# Patient Record
Sex: MALE | Race: BLACK OR AFRICAN AMERICAN | NOT HISPANIC OR LATINO | Employment: FULL TIME | URBAN - METROPOLITAN AREA
[De-identification: names, ages, dates, MRNs, and addresses within clinical notes are randomized per-mention and may not be internally consistent; named-entity substitution may affect disease eponyms.]

---

## 2017-11-06 ENCOUNTER — HOSPITAL ENCOUNTER (EMERGENCY)
Facility: HOSPITAL | Age: 37
Discharge: HOME | End: 2017-11-06
Admitting: EMERGENCY MEDICINE
Payer: COMMERCIAL

## 2017-11-06 VITALS
TEMPERATURE: 97.8 F | OXYGEN SATURATION: 97 % | DIASTOLIC BLOOD PRESSURE: 86 MMHG | HEART RATE: 98 BPM | SYSTOLIC BLOOD PRESSURE: 154 MMHG | RESPIRATION RATE: 18 BRPM

## 2017-11-06 DIAGNOSIS — K04.7 DENTAL ABSCESS: Primary | ICD-10-CM

## 2017-11-06 PROCEDURE — 99282 EMERGENCY DEPT VISIT SF MDM: CPT

## 2017-11-06 RX ORDER — AMOXICILLIN AND CLAVULANATE POTASSIUM 875; 125 MG/1; MG/1
1 TABLET, FILM COATED ORAL ONCE
Status: COMPLETED | OUTPATIENT
Start: 2017-11-06 | End: 2017-11-06

## 2017-11-06 RX ORDER — AMOXICILLIN AND CLAVULANATE POTASSIUM 875; 125 MG/1; MG/1
1 TABLET, FILM COATED ORAL EVERY 12 HOURS SCHEDULED
Qty: 20 TABLET | Refills: 0 | Status: SHIPPED | OUTPATIENT
Start: 2017-11-06 | End: 2017-11-16

## 2017-11-06 RX ADMIN — LIDOCAINE HYDROCHLORIDE 15 ML: 20 SOLUTION ORAL; TOPICAL at 23:14

## 2017-11-06 RX ADMIN — AMOXICILLIN AND CLAVULANATE POTASSIUM 1 TABLET: 875; 125 TABLET, FILM COATED ORAL at 23:26

## 2017-11-07 NOTE — DISCHARGE INSTRUCTIONS
Dental Abscess   WHAT YOU NEED TO KNOW:   A dental abscess is a collection of pus in or around a tooth  A dental abscess is caused by bacteria  The bacteria usually enter the tooth when the enamel (outer part of the tooth) is damaged by tooth decay  Bacteria may also enter the tooth through a break or chip in the tooth, or a cut in the gum  Food particles that are stuck between the teeth for a long time may also lead to an abscess  DISCHARGE INSTRUCTIONS:   Return to the emergency department if:   · You have severe pain  · You have trouble breathing because of pain or swelling  Contact your healthcare provider if:   · Your symptoms get worse, even after treatment  · Your mouth is bleeding  · You cannot eat or drink because of pain or swelling  · Your abscess returns  · You have an injury that causes a crack in your tooth  · You have questions or concerns about your condition or care  Medicines: You may  need any of the following:  · Antibiotics  help treat a bacterial infection  · NSAIDs , such as ibuprofen, help decrease swelling, pain, and fever  This medicine is available with or without a doctor's order  NSAIDs can cause stomach bleeding or kidney problems in certain people  If you take blood thinner medicine, always ask your healthcare provider if NSAIDs are safe for you  Always read the medicine label and follow directions  · Acetaminophen  decreases pain and fever  It is available without a doctor's order  Ask how much to take and how often to take it  Follow directions  Read the labels of all other medicines you are using to see if they also contain acetaminophen, or ask your doctor or pharmacist  Acetaminophen can cause liver damage if not taken correctly  Do not use more than 4 grams (4,000 milligrams) total of acetaminophen in one day  · Prescription pain medicine  may be given  Ask your healthcare provider how to take this medicine safely   Some prescription pain medicines contain acetaminophen  Do not take other medicines that contain acetaminophen without talking to your healthcare provider  Too much acetaminophen may cause liver damage  Prescription pain medicine may cause constipation  Ask your healthcare provider how to prevent or treat constipation  · Take your medicine as directed  Contact your healthcare provider if you think your medicine is not helping or if you have side effects  Tell him of her if you are allergic to any medicine  Keep a list of the medicines, vitamins, and herbs you take  Include the amounts, and when and why you take them  Bring the list or the pill bottles to follow-up visits  Carry your medicine list with you in case of an emergency  Self-care:   · Rinse your mouth every 2 hours with salt water  This will help keep the area clean  · Gently brush your teeth twice a day with a soft tooth brush  This will help keep the area clean  · Eat soft foods as directed  Soft foods may cause less pain  Examples include applesauce, yogurt, and cooked pasta  Ask your healthcare provider how long to follow this instruction  · Apply a warm compress to your tooth or gum  Use a cotton ball or gauze soaked in warm water  Remove the compress in 10 minutes or when it becomes cool  Repeat 3 times a day  Prevent another abscess:   · Brush your teeth at least 2 times a day with fluoride toothpaste  · Use dental floss to clean between your teeth at least once a day  · Rinse your mouth with water or mouthwash after meals and snacks  · Chew sugarless gum after meals and snacks  · Limit foods that are sticky and high in sugar such as raisons  Also limit drinks high in sugar, such as soda  · See your dentist every 6 months for dental cleanings and oral exams  Follow up with your healthcare provider in 24 hours: Your healthcare provider will need to check your teeth and gums   Write down your questions so you remember to ask them during your visits  © 2017 [employer] Information is for End User's use only and may not be sold, redistributed or otherwise used for commercial purposes  All illustrations and images included in CareNotes® are the copyrighted property of [employer]  or [employer]  The above information is an  only  It is not intended as medical advice for individual conditions or treatments  Talk to your doctor, nurse or pharmacist before following any medical regimen to see if it is safe and effective for you

## 2017-11-07 NOTE — ED PROVIDER NOTES
History  Chief Complaint   Patient presents with    Dental Pain     right upper tootache x2days      41-year-old male presents today complaining of right upper toothache for the past 2 days  He reports facial swelling and gingival swelling  No fevers  No history of dental abscess  Last saw a dentist 1 year ago  Has been taking motrin for pain, last dose 3 hours ago  None       Past Medical History:   Diagnosis Date    No known health problems        Past Surgical History:   Procedure Laterality Date    NO PAST SURGERIES         History reviewed  No pertinent family history  I have reviewed and agree with the history as documented  Social History   Substance Use Topics    Smoking status: Never Smoker    Smokeless tobacco: Never Used    Alcohol use Yes      Comment: social        Review of Systems   HENT: Positive for dental problem  All other systems reviewed and are negative  Physical Exam  ED Triage Vitals [11/06/17 2250]   Temperature Pulse Respirations Blood Pressure SpO2   97 8 °F (36 6 °C) 98 18 154/86 97 %      Temp Source Heart Rate Source Patient Position - Orthostatic VS BP Location FiO2 (%)   Temporal -- -- -- --      Pain Score       8           Orthostatic Vital Signs  Vitals:    11/06/17 2250   BP: 154/86   Pulse: 98       Physical Exam   Constitutional: He appears well-developed and well-nourished  No distress  HENT:   Head: Normocephalic and atraumatic  Mouth/Throat: Mucous membranes are normal  No trismus in the jaw  Dental abscesses present  Right-sided facial swelling with right upper gingival swelling noted  Drainage attempted with 18g needle, however aspiration was unsuccessful  Eyes: Conjunctivae and EOM are normal    Neck: Normal range of motion  Neck supple  Cardiovascular: Normal rate and regular rhythm  Pulmonary/Chest: Effort normal and breath sounds normal  No respiratory distress  He has no wheezes  Abdominal: Soft     Musculoskeletal: Normal range of motion  Lymphadenopathy:     He has no cervical adenopathy  Neurological: He is alert  Skin: Skin is warm and dry  He is not diaphoretic  ED Medications  Medications   lidocaine viscous (XYLOCAINE) 2 % mucosal solution 15 mL (15 mL Swish & Spit Given 11/6/17 0240)   amoxicillin-clavulanate (AUGMENTIN) 875-125 mg per tablet 1 tablet (1 tablet Oral Given 11/6/17 6003)       Diagnostic Studies  Results Reviewed     None                 No orders to display              Procedures  Procedures       Phone Contacts  ED Phone Contact    ED Course  ED Course                                MDM  CritCare Time    Disposition  Final diagnoses:   Dental abscess     Time reflects when diagnosis was documented in both MDM as applicable and the Disposition within this note     Time User Action Codes Description Comment    11/6/2017 11:23 PM Earnest Martini Add [K04 7] Dental abscess       ED Disposition     ED Disposition Condition Comment    Discharge  Jaki Naik discharge to home/self care  Condition at discharge: Good        Follow-up Information     Follow up With Specialties Details Why Contact Info       Follow-up with your dentist as soon as possible  Patient's Medications   Discharge Prescriptions    AMOXICILLIN-CLAVULANATE (AUGMENTIN) 875-125 MG PER TABLET    Take 1 tablet by mouth every 12 (twelve) hours for 10 days       Start Date: 11/6/2017 End Date: 11/16/2017       Order Dose: 1 tablet       Quantity: 20 tablet    Refills: 0     No discharge procedures on file      ED Provider  Electronically Signed by           Osiel Britton PA-C  11/06/17 4668

## 2018-03-29 LAB
ABSOL LYMPHOCYTES (HISTORICAL): 2.4 K/UL (ref 0.5–4)
ALBUMIN SERPL BCP-MCNC: 4.2 G/DL (ref 3–5.2)
ALP SERPL-CCNC: 91 U/L (ref 43–122)
ALT SERPL W P-5'-P-CCNC: 43 U/L (ref 9–52)
ANION GAP SERPL CALCULATED.3IONS-SCNC: 11 MMOL/L (ref 5–14)
AST SERPL W P-5'-P-CCNC: 32 U/L (ref 17–59)
BASOPHILS # BLD AUTO: 0.1 K/UL (ref 0–0.1)
BASOPHILS # BLD AUTO: 1 % (ref 0–1)
BILIRUB SERPL-MCNC: 0.3 MG/DL
BUN SERPL-MCNC: 14 MG/DL (ref 5–25)
CALCIUM SERPL-MCNC: 9.5 MG/DL (ref 8.4–10.2)
CHLORIDE SERPL-SCNC: 105 MEQ/L (ref 97–108)
CHOLEST SERPL-MCNC: 162 MG/DL
CHOLEST/HDLC SERPL: 6.2 {RATIO}
CO2 SERPL-SCNC: 27 MMOL/L (ref 22–30)
COMMENT (HISTORICAL): ABNORMAL
CREATINE, SERUM (HISTORICAL): 0.86 MG/DL (ref 0.7–1.5)
DEPRECATED RDW RBC AUTO: 13.5 %
EGFR (HISTORICAL): >60 ML/MIN/1.73 M2
EOSINOPHIL # BLD AUTO: 0.4 K/UL (ref 0–0.4)
EOSINOPHIL NFR BLD AUTO: 4 % (ref 0–6)
GLUCOSE SERPL-MCNC: 80 MG/DL (ref 70–99)
HCT VFR BLD AUTO: 45.1 % (ref 41–53)
HDLC SERPL-MCNC: 26 MG/DL
HGB BLD-MCNC: 15.1 G/DL (ref 13.5–17.5)
LDL/HDL RATIO (HISTORICAL): 3.8
LDLC SERPL CALC-MCNC: 98 MG/DL
LYMPHOCYTES NFR BLD AUTO: 21 % (ref 25–45)
MCH RBC QN AUTO: 30.1 PG (ref 26–34)
MCHC RBC AUTO-ENTMCNC: 33.4 % (ref 31–36)
MCV RBC AUTO: 90 FL (ref 80–100)
MONOCYTES # BLD AUTO: 1.5 K/UL (ref 0.2–0.9)
MONOCYTES NFR BLD AUTO: 13 % (ref 1–10)
NEUTROPHILS ABS COUNT (HISTORICAL): 6.8 K/UL (ref 1.8–7.8)
NEUTS SEG NFR BLD AUTO: 61 % (ref 45–65)
PLATELET # BLD AUTO: 400 K/MCL (ref 150–450)
POTASSIUM SERPL-SCNC: 4.1 MEQ/L (ref 3.6–5)
RBC # BLD AUTO: 5 M/MCL (ref 4.5–5.9)
RBC MORPHOLOGY (HISTORICAL): ABNORMAL
SODIUM SERPL-SCNC: 143 MEQ/L (ref 137–147)
TOTAL PROTEIN (HISTORICAL): 7.5 G/DL (ref 5.9–8.4)
TRIGL SERPL-MCNC: 189 MG/DL
TSH SERPL DL<=0.05 MIU/L-ACNC: 3.39 UIU/ML (ref 0.47–4.68)
VLDLC SERPL CALC-MCNC: 38 MG/DL (ref 0–40)
WBC # BLD AUTO: 11.2 K/MCL (ref 4.5–11)

## 2019-06-18 ENCOUNTER — APPOINTMENT (OUTPATIENT)
Dept: LAB | Facility: CLINIC | Age: 39
End: 2019-06-18
Payer: COMMERCIAL

## 2019-06-18 ENCOUNTER — OFFICE VISIT (OUTPATIENT)
Dept: FAMILY MEDICINE CLINIC | Facility: CLINIC | Age: 39
End: 2019-06-18
Payer: COMMERCIAL

## 2019-06-18 VITALS
DIASTOLIC BLOOD PRESSURE: 94 MMHG | WEIGHT: 274 LBS | HEART RATE: 84 BPM | SYSTOLIC BLOOD PRESSURE: 140 MMHG | HEIGHT: 74 IN | BODY MASS INDEX: 35.16 KG/M2

## 2019-06-18 DIAGNOSIS — Z00.00 PHYSICAL EXAM: Primary | ICD-10-CM

## 2019-06-18 DIAGNOSIS — Z00.00 PHYSICAL EXAM: ICD-10-CM

## 2019-06-18 DIAGNOSIS — M79.10 MYALGIA: ICD-10-CM

## 2019-06-18 DIAGNOSIS — K21.9 GASTROESOPHAGEAL REFLUX DISEASE, ESOPHAGITIS PRESENCE NOT SPECIFIED: ICD-10-CM

## 2019-06-18 DIAGNOSIS — R53.83 FATIGUE, UNSPECIFIED TYPE: ICD-10-CM

## 2019-06-18 DIAGNOSIS — R63.4 WEIGHT LOSS: ICD-10-CM

## 2019-06-18 LAB
25(OH)D3 SERPL-MCNC: 8.4 NG/ML (ref 30–100)
ALBUMIN SERPL BCP-MCNC: 3.7 G/DL (ref 3.5–5)
ALP SERPL-CCNC: 102 U/L (ref 46–116)
ALT SERPL W P-5'-P-CCNC: 28 U/L (ref 12–78)
ANION GAP SERPL CALCULATED.3IONS-SCNC: 2 MMOL/L (ref 4–13)
AST SERPL W P-5'-P-CCNC: 26 U/L (ref 5–45)
BILIRUB SERPL-MCNC: 0.57 MG/DL (ref 0.2–1)
BUN SERPL-MCNC: 9 MG/DL (ref 5–25)
CALCIUM SERPL-MCNC: 9.2 MG/DL (ref 8.3–10.1)
CHLORIDE SERPL-SCNC: 106 MMOL/L (ref 100–108)
CO2 SERPL-SCNC: 28 MMOL/L (ref 21–32)
CREAT SERPL-MCNC: 0.83 MG/DL (ref 0.6–1.3)
ERYTHROCYTE [DISTWIDTH] IN BLOOD BY AUTOMATED COUNT: 13.3 % (ref 11.6–15.1)
GFR SERPL CREATININE-BSD FRML MDRD: 127 ML/MIN/1.73SQ M
GLUCOSE P FAST SERPL-MCNC: 96 MG/DL (ref 65–99)
HCT VFR BLD AUTO: 46.7 % (ref 36.5–49.3)
HGB BLD-MCNC: 14.9 G/DL (ref 12–17)
MCH RBC QN AUTO: 30.1 PG (ref 26.8–34.3)
MCHC RBC AUTO-ENTMCNC: 31.9 G/DL (ref 31.4–37.4)
MCV RBC AUTO: 94 FL (ref 82–98)
PLATELET # BLD AUTO: 378 THOUSANDS/UL (ref 149–390)
PMV BLD AUTO: 9.7 FL (ref 8.9–12.7)
POTASSIUM SERPL-SCNC: 4.3 MMOL/L (ref 3.5–5.3)
PROT SERPL-MCNC: 8.2 G/DL (ref 6.4–8.2)
RBC # BLD AUTO: 4.95 MILLION/UL (ref 3.88–5.62)
SODIUM SERPL-SCNC: 136 MMOL/L (ref 136–145)
TSH SERPL DL<=0.05 MIU/L-ACNC: 2.16 UIU/ML (ref 0.36–3.74)
WBC # BLD AUTO: 12.57 THOUSAND/UL (ref 4.31–10.16)

## 2019-06-18 PROCEDURE — 80053 COMPREHEN METABOLIC PANEL: CPT

## 2019-06-18 PROCEDURE — 86618 LYME DISEASE ANTIBODY: CPT

## 2019-06-18 PROCEDURE — 85027 COMPLETE CBC AUTOMATED: CPT

## 2019-06-18 PROCEDURE — 99396 PREV VISIT EST AGE 40-64: CPT | Performed by: FAMILY MEDICINE

## 2019-06-18 PROCEDURE — 82306 VITAMIN D 25 HYDROXY: CPT

## 2019-06-18 PROCEDURE — 84443 ASSAY THYROID STIM HORMONE: CPT

## 2019-06-18 PROCEDURE — 36415 COLL VENOUS BLD VENIPUNCTURE: CPT

## 2019-06-18 RX ORDER — RANITIDINE 150 MG/1
150 CAPSULE ORAL 2 TIMES DAILY
Qty: 60 CAPSULE | Refills: 1 | Status: SHIPPED | OUTPATIENT
Start: 2019-06-18 | End: 2019-10-09 | Stop reason: ALTCHOICE

## 2019-06-20 LAB
B BURGDOR IGG SER IA-ACNC: 0.12
B BURGDOR IGM SER IA-ACNC: 0.17

## 2019-06-25 ENCOUNTER — TELEPHONE (OUTPATIENT)
Dept: FAMILY MEDICINE CLINIC | Facility: CLINIC | Age: 39
End: 2019-06-25

## 2019-07-02 ENCOUNTER — TELEPHONE (OUTPATIENT)
Dept: FAMILY MEDICINE CLINIC | Facility: CLINIC | Age: 39
End: 2019-07-02

## 2019-07-02 NOTE — TELEPHONE ENCOUNTER
Pt's vitamin D level is extremely low and that can be th cause of his muscle and bone pain  I advise Ergocalciferol 50,000 U PO Q Wk x 20 weeks with a repeat Vitamin D level in between 16-18 weeks   If patient is agreeable, make orders appropriately and forward them to me for my approval

## 2019-07-08 DIAGNOSIS — E55.9 VITAMIN D DEFICIENCY: Primary | ICD-10-CM

## 2019-07-08 RX ORDER — ERGOCALCIFEROL 1.25 MG/1
50000 CAPSULE ORAL WEEKLY
Qty: 4 CAPSULE | Refills: 5 | Status: SHIPPED | OUTPATIENT
Start: 2019-07-08 | End: 2019-10-09 | Stop reason: ALTCHOICE

## 2019-09-19 ENCOUNTER — OFFICE VISIT (OUTPATIENT)
Dept: FAMILY MEDICINE CLINIC | Facility: CLINIC | Age: 39
End: 2019-09-19
Payer: COMMERCIAL

## 2019-09-19 ENCOUNTER — APPOINTMENT (OUTPATIENT)
Dept: LAB | Facility: HOSPITAL | Age: 39
End: 2019-09-19
Payer: COMMERCIAL

## 2019-09-19 ENCOUNTER — HOSPITAL ENCOUNTER (OUTPATIENT)
Dept: RADIOLOGY | Facility: HOSPITAL | Age: 39
Discharge: HOME | End: 2019-09-19
Payer: COMMERCIAL

## 2019-09-19 VITALS
HEIGHT: 74 IN | HEART RATE: 82 BPM | SYSTOLIC BLOOD PRESSURE: 120 MMHG | DIASTOLIC BLOOD PRESSURE: 80 MMHG | BODY MASS INDEX: 34.65 KG/M2 | OXYGEN SATURATION: 93 % | WEIGHT: 270 LBS | TEMPERATURE: 98 F | RESPIRATION RATE: 18 BRPM

## 2019-09-19 DIAGNOSIS — G89.29 CHRONIC RIGHT SHOULDER PAIN: ICD-10-CM

## 2019-09-19 DIAGNOSIS — R63.4 WEIGHT LOSS: ICD-10-CM

## 2019-09-19 DIAGNOSIS — M25.511 CHRONIC RIGHT SHOULDER PAIN: ICD-10-CM

## 2019-09-19 DIAGNOSIS — D72.829 LEUKOCYTOSIS, UNSPECIFIED TYPE: ICD-10-CM

## 2019-09-19 DIAGNOSIS — E55.9 VITAMIN D DEFICIENCY: Primary | ICD-10-CM

## 2019-09-19 DIAGNOSIS — E55.9 VITAMIN D DEFICIENCY: ICD-10-CM

## 2019-09-19 DIAGNOSIS — K21.9 GASTROESOPHAGEAL REFLUX DISEASE WITHOUT ESOPHAGITIS: ICD-10-CM

## 2019-09-19 LAB
25(OH)D3 SERPL-MCNC: 25.2 NG/ML (ref 30–100)
CRP SERPL QL: 23.5 MG/L
ERYTHROCYTE [DISTWIDTH] IN BLOOD BY AUTOMATED COUNT: 13.3 % (ref 11.6–15.1)
HCT VFR BLD AUTO: 48.8 % (ref 36.5–49.3)
HGB BLD-MCNC: 15.6 G/DL (ref 12–17)
MCH RBC QN AUTO: 30.9 PG (ref 26.8–34.3)
MCHC RBC AUTO-ENTMCNC: 32 G/DL (ref 31.4–37.4)
MCV RBC AUTO: 97 FL (ref 82–98)
PLATELET # BLD AUTO: 442 THOUSANDS/UL (ref 149–390)
PMV BLD AUTO: 9.2 FL (ref 8.9–12.7)
RBC # BLD AUTO: 5.05 MILLION/UL (ref 3.88–5.62)
WBC # BLD AUTO: 14.05 THOUSAND/UL (ref 4.31–10.16)

## 2019-09-19 PROCEDURE — 85027 COMPLETE CBC AUTOMATED: CPT | Performed by: FAMILY MEDICINE

## 2019-09-19 PROCEDURE — 99214 OFFICE O/P EST MOD 30 MIN: CPT | Performed by: FAMILY MEDICINE

## 2019-09-19 PROCEDURE — 73030 X-RAY EXAM OF SHOULDER: CPT

## 2019-09-19 PROCEDURE — 86677 HELICOBACTER PYLORI ANTIBODY: CPT | Performed by: FAMILY MEDICINE

## 2019-09-19 PROCEDURE — 86140 C-REACTIVE PROTEIN: CPT

## 2019-09-19 PROCEDURE — 82306 VITAMIN D 25 HYDROXY: CPT

## 2019-09-19 PROCEDURE — 36415 COLL VENOUS BLD VENIPUNCTURE: CPT | Performed by: FAMILY MEDICINE

## 2019-09-19 RX ORDER — ALBUTEROL SULFATE 90 UG/1
AEROSOL, METERED RESPIRATORY (INHALATION)
COMMUNITY
Start: 2018-05-03 | End: 2020-03-19 | Stop reason: SDUPTHER

## 2019-09-19 NOTE — PATIENT INSTRUCTIONS
Joel Rg was seen today for follow-up  Diagnoses and all orders for this visit:    Vitamin D deficiency  Comments:  - Continue Vit D supplement as instructed  - Repeat Vitamin D level after 5 weeks of therapy  Orders:  -     Vitamin D 25 hydroxy; Future    Chronic right shoulder pain  Comments:  - Continue Zantac 150 BID  - Discussed nonpharmacological measures  - May return to work on Monday (9/23/19)  Orders:  -     Ambulatory referral to Physical Therapy; Future  -     XR shoulder 2+ vw right; Future    Gastroesophageal reflux disease without esophagitis  -     H  pylori antibody, IgG    Leukocytosis, unspecified type  -     CBC    Weight loss  -     C-reactive protein;  Future

## 2019-09-19 NOTE — PROGRESS NOTES
Assessment and Plan:    Problem List Items Addressed This Visit     None      Visit Diagnoses     Vitamin D deficiency    -  Primary    - Continue Vit D supplement as instructed  - Repeat Vitamin D level after 5 weeks of therapy    Relevant Orders    Vitamin D 25 hydroxy    Chronic right shoulder pain        - Continue Zantac 150 BID  - Discussed nonpharmacological measures  - May return to work on Monday (9/23/19)    Relevant Orders    Ambulatory referral to Physical Therapy    XR shoulder 2+ vw right    Gastroesophageal reflux disease without esophagitis        Relevant Orders    H  pylori antibody, IgG    Leukocytosis, unspecified type        Relevant Orders    CBC (Completed)    Weight loss        Relevant Orders    C-reactive protein (Completed)                 Diagnoses and all orders for this visit:    Vitamin D deficiency  Comments:  - Continue Vit D supplement as instructed  - Repeat Vitamin D level after 5 weeks of therapy  Orders:  -     Vitamin D 25 hydroxy; Future    Chronic right shoulder pain  Comments:  - Continue Zantac 150 BID  - Discussed nonpharmacological measures  - May return to work on Monday (9/23/19)  Orders:  -     Ambulatory referral to Physical Therapy; Future  -     XR shoulder 2+ vw right; Future    Gastroesophageal reflux disease without esophagitis  -     H  pylori antibody, IgG    Leukocytosis, unspecified type  -     CBC    Weight loss  -     C-reactive protein; Future    Other orders  -     albuterol (PROAIR HFA) 90 mcg/act inhaler; inhale 2 puff by inhalation route15 minute before exercise or every 6 hours as needed              Subjective:      Patient ID: Deisy Kumar is a 34 y o  male  CC:    Chief Complaint   Patient presents with    Follow-up     pt is here for a follow up from his blood work, and concerns about pain throughout his whole body       HPI:    Pt started taking Ergocalciferol supplement weekly   Still c/o pain almost all over his body including muscle & bone and not particularly joints    However, he has more pain over his R  Shoulder pain that has been going on for long time  Pt also c/o b/l hand pain & stiffness but not particularly in the morning  Pt is single, not sure if he snores  Denies daytime sleepiness  The following portions of the patient's history were reviewed and updated as appropriate: past social history and past surgical history  Review of Systems   Constitutional: Positive for fatigue and unexpected weight change  Negative for appetite change  HENT: Negative  Respiratory: Negative for cough and shortness of breath  Cardiovascular: Negative for chest pain  Gastrointestinal: Negative for blood in stool, constipation, diarrhea, nausea and vomiting  Heart burn   Endocrine: Negative for polyuria  Musculoskeletal: Positive for myalgias  Skin: Negative for rash  Neurological: Negative for dizziness and headaches  Data to review:       Objective:    Vitals:    09/19/19 1208   BP: 120/80   BP Location: Left arm   Patient Position: Sitting   Cuff Size: Adult   Pulse: 82   Resp: 18   Temp: 98 °F (36 7 °C)   TempSrc: Temporal   SpO2: 93%   Weight: 122 kg (270 lb)   Height: 6' 1 5" (1 867 m)        Physical Exam   Constitutional: He appears well-developed and well-nourished  Eyes: Pupils are equal, round, and reactive to light  Conjunctivae and EOM are normal    Neck: No thyromegaly present  Cardiovascular: Normal rate, regular rhythm and normal heart sounds  No murmur heard  Pulmonary/Chest: Effort normal and breath sounds normal  No respiratory distress  Abdominal: There is no tenderness  Lymphadenopathy:     He has no cervical adenopathy  Neurological: He is alert  He displays normal reflexes  No cranial nerve deficit  Skin: Capillary refill takes less than 2 seconds     Psychiatric:   Anxious

## 2019-09-20 LAB — H PYLORI IGG SER IA-ACNC: 0.61 INDEX VALUE (ref 0–0.79)

## 2019-09-26 ENCOUNTER — TELEPHONE (OUTPATIENT)
Dept: FAMILY MEDICINE CLINIC | Facility: CLINIC | Age: 39
End: 2019-09-26

## 2019-10-04 ENCOUNTER — TELEPHONE (OUTPATIENT)
Dept: FAMILY MEDICINE CLINIC | Facility: CLINIC | Age: 39
End: 2019-10-04

## 2019-10-04 NOTE — TELEPHONE ENCOUNTER
Arianna Marr, this is a Dr Mariama Meyer patient  He called to review his lab results  Dr Mariama Meyer had reached out to pt himself and [facility] for pt to c/b but left no instructions for us to tell pt  Can you please review  I spoke to pt and he states Dr Mariama Meyer did the BW do to unintentional weight loss and muscle pains  Are you willing to review and let me know  Thank you

## 2019-10-04 NOTE — TELEPHONE ENCOUNTER
Patient has had several nonspecific abnormalities of his labs including elevated white count and CRP  Accompanied by weight loss I would feel more comfortable if he had follow-up with 1 of the providers to see if further imaging studies are necessary  His vitamin-D level was also low and he should take 5000 units of vitamin-D 3 daily

## 2019-10-09 ENCOUNTER — OFFICE VISIT (OUTPATIENT)
Dept: FAMILY MEDICINE CLINIC | Facility: CLINIC | Age: 39
End: 2019-10-09
Payer: COMMERCIAL

## 2019-10-09 VITALS
SYSTOLIC BLOOD PRESSURE: 118 MMHG | WEIGHT: 275.2 LBS | BODY MASS INDEX: 35.32 KG/M2 | HEART RATE: 88 BPM | DIASTOLIC BLOOD PRESSURE: 72 MMHG | HEIGHT: 74 IN

## 2019-10-09 DIAGNOSIS — Z83.3 FAMILY HISTORY OF DIABETES MELLITUS: ICD-10-CM

## 2019-10-09 DIAGNOSIS — R63.4 WEIGHT LOSS: ICD-10-CM

## 2019-10-09 DIAGNOSIS — D72.829 LEUKOCYTOSIS, UNSPECIFIED TYPE: ICD-10-CM

## 2019-10-09 DIAGNOSIS — E78.2 MIXED HYPERLIPIDEMIA: ICD-10-CM

## 2019-10-09 DIAGNOSIS — K04.7 DENTAL ABSCESS: ICD-10-CM

## 2019-10-09 DIAGNOSIS — F41.1 GENERALIZED ANXIETY DISORDER: ICD-10-CM

## 2019-10-09 DIAGNOSIS — E66.09 CLASS 2 OBESITY DUE TO EXCESS CALORIES WITHOUT SERIOUS COMORBIDITY WITH BODY MASS INDEX (BMI) OF 35.0 TO 35.9 IN ADULT: ICD-10-CM

## 2019-10-09 DIAGNOSIS — E55.9 VITAMIN D DEFICIENCY: Primary | ICD-10-CM

## 2019-10-09 DIAGNOSIS — K21.9 GASTROESOPHAGEAL REFLUX DISEASE WITHOUT ESOPHAGITIS: ICD-10-CM

## 2019-10-09 PROBLEM — E66.9 OBESITY: Status: ACTIVE | Noted: 2019-10-09

## 2019-10-09 PROCEDURE — 3008F BODY MASS INDEX DOCD: CPT | Performed by: FAMILY MEDICINE

## 2019-10-09 PROCEDURE — 99214 OFFICE O/P EST MOD 30 MIN: CPT | Performed by: FAMILY MEDICINE

## 2019-10-09 RX ORDER — FAMOTIDINE 40 MG/1
40 TABLET, FILM COATED ORAL DAILY
Qty: 30 TABLET | Refills: 5 | Status: SHIPPED | OUTPATIENT
Start: 2019-10-09 | End: 2020-03-19 | Stop reason: SDUPTHER

## 2019-10-09 NOTE — ASSESSMENT & PLAN NOTE
Patient does have a dental abscess  This is based on his description  Given that he also has some facial discomfort, I would be concerned that there is a communication with the sinuses  CT of the sinuses recommended  Follow with oral surgery as well  Patient can certainly follow with dental as well  Of note, patient does have a high CRP, as well as an increased white count  This is likely on the basis of the dental abscess

## 2019-10-09 NOTE — ASSESSMENT & PLAN NOTE
Patient does have an increased white count, but is likely on the basis of the smoldering dental abscess that he has  At this point, would recommend that he get CT scan of the face as this would help with diagnosis of the dental abscess and the possibility of communicating to the sinuses given that he is having a significant amount of facial discomfort

## 2019-10-09 NOTE — PATIENT INSTRUCTIONS
Problem List Items Addressed This Visit     Dental abscess     Patient does have a dental abscess  This is based on his description  Given that he also has some facial discomfort, I would be concerned that there is a communication with the sinuses  CT of the sinuses recommended  Follow with oral surgery as well  Patient can certainly follow with dental as well  Of note, patient does have a high CRP, as well as an increased white count  This is likely on the basis of the dental abscess  Relevant Orders    Ambulatory referral to Oral Maxillofacial Surgery    CBC and differential    CT sinus wo contrast    Family history of diabetes mellitus     Prior blood sugar was normal   It was 96 in June  Follow in the future  Limit carbohydrates  Gastroesophageal reflux disease without esophagitis     Patient seems to be doing relatively well  He does have reflux symptoms at times, more specifically some irritation of gas buildup in the stomach  Recommend change from Zantac to Pepcid 40 mg  Re-evaluate in the future  Certainly, with weight loss I would expect improvement  Of note, his BMI is not severely abnormal, but the goal still is 25 or less  Relevant Medications    famotidine (PEPCID) 40 MG tablet    RESOLVED: Generalized anxiety disorder     Patient was having significant issue years ago  Currently, he reports that he is not having any issues  He is not currently on medications  This diagnosis was updated from reconciliation, i e  Was a diagnosis listed at [facility]  Will resolved today  Leukocytosis     Patient does have an increased white count, but is likely on the basis of the smoldering dental abscess that he has  At this point, would recommend that he get CT scan of the face as this would help with diagnosis of the dental abscess and the possibility of communicating to the sinuses given that he is having a significant amount of facial discomfort           Relevant Orders CBC and differential    Mixed hyperlipidemia     HDL was low on prior lipid panel in 2018  Recheck  Relevant Orders    Comprehensive metabolic panel    Lipid panel    Obesity     Patient does have elevated BMI  Recommend concentrate on limiting carbohydrates, increasing exercise, and follow up after that  Relevant Orders    Comprehensive metabolic panel    Vitamin D deficiency - Primary     Vitamin-D is much improved versus before  I would recommend at this point that the patient finish the current bottle of 62208 unit tablets (prescription), and then start 5000 unit over-the-counter supplements  Recheck in 6 months  Relevant Medications    Cholecalciferol (VITAMIN D-3) 5000 units TABS    Weight loss     Weight loss was noted previously, but was a slow weight loss  Patient has recently increased weight  There may been some effect from dental abscess before, as he mentioned he was not eating much at all

## 2019-10-09 NOTE — ASSESSMENT & PLAN NOTE
Weight loss was noted previously, but was a slow weight loss  Patient has recently increased weight  There may been some effect from dental abscess before, as he mentioned he was not eating much at all

## 2019-10-09 NOTE — ASSESSMENT & PLAN NOTE
Vitamin-D is much improved versus before  I would recommend at this point that the patient finish the current bottle of 51715 unit tablets (prescription), and then start 5000 unit over-the-counter supplements  Recheck in 6 months

## 2019-10-09 NOTE — PROGRESS NOTES
Assessment and Plan:    Problem List Items Addressed This Visit     Dental abscess     Patient does have a dental abscess  This is based on his description  Given that he also has some facial discomfort, I would be concerned that there is a communication with the sinuses  CT of the sinuses recommended  Follow with oral surgery as well  Patient can certainly follow with dental as well  Of note, patient does have a high CRP, as well as an increased white count  This is likely on the basis of the dental abscess  Relevant Orders    Ambulatory referral to Oral Maxillofacial Surgery    CBC and differential    CT sinus wo contrast    Family history of diabetes mellitus     Prior blood sugar was normal   It was 96 in June  Follow in the future  Limit carbohydrates  Gastroesophageal reflux disease without esophagitis     Patient seems to be doing relatively well  He does have reflux symptoms at times, more specifically some irritation of gas buildup in the stomach  Recommend change from Zantac to Pepcid 40 mg  Re-evaluate in the future  Certainly, with weight loss I would expect improvement  Of note, his BMI is not severely abnormal, but the goal still is 25 or less  Relevant Medications    famotidine (PEPCID) 40 MG tablet    RESOLVED: Generalized anxiety disorder     Patient was having significant issue years ago  Currently, he reports that he is not having any issues  He is not currently on medications  This diagnosis was updated from reconciliation, i e  Was a diagnosis listed at [facility]  Will resolved today  Leukocytosis     Patient does have an increased white count, but is likely on the basis of the smoldering dental abscess that he has  At this point, would recommend that he get CT scan of the face as this would help with diagnosis of the dental abscess and the possibility of communicating to the sinuses given that he is having a significant amount of facial discomfort  Relevant Orders    CBC and differential    Mixed hyperlipidemia     HDL was low on prior lipid panel in 2018  Recheck  Relevant Orders    Comprehensive metabolic panel    Lipid panel    Obesity     Patient does have elevated BMI  Recommend concentrate on limiting carbohydrates, increasing exercise, and follow up after that  Relevant Orders    Comprehensive metabolic panel    Vitamin D deficiency - Primary     Vitamin-D is much improved versus before  I would recommend at this point that the patient finish the current bottle of 83246 unit tablets (prescription), and then start 5000 unit over-the-counter supplements  Recheck in 6 months  Relevant Medications    Cholecalciferol (VITAMIN D-3) 5000 units TABS    Weight loss     Weight loss was noted previously, but was a slow weight loss  Patient has recently increased weight  There may been some effect from dental abscess before, as he mentioned he was not eating much at all  Diagnoses and all orders for this visit:    Vitamin D deficiency  -     Cholecalciferol (VITAMIN D-3) 5000 units TABS; Take 5,000 mg by mouth daily    Gastroesophageal reflux disease without esophagitis  -     famotidine (PEPCID) 40 MG tablet; Take 1 tablet (40 mg total) by mouth daily    Leukocytosis, unspecified type  -     CBC and differential; Future    Weight loss    Class 2 obesity due to excess calories without serious comorbidity with body mass index (BMI) of 35 0 to 35 9 in adult  -     Comprehensive metabolic panel; Future    Generalized anxiety disorder    Family history of diabetes mellitus    Dental abscess  -     Ambulatory referral to Oral Maxillofacial Surgery; Future  -     CBC and differential; Future  -     CT sinus wo contrast; Future    Mixed hyperlipidemia  -     Comprehensive metabolic panel; Future  -     Lipid panel; Future              Subjective:      Patient ID: Nu Andrade is a 34 y o  male      CC:    Chief Complaint   Patient presents with    Follow-up     Follow up and review BW from 9/2019  kw    Results    Vitamin D Deficiency     Pt would like to know once 50,000 unit of Vitamin D is done how much OTC should he use  kw       HPI:    Patient is here for several issues  With regard to laboratory studies, reviewed  He has had tooth infection for about a year now  Had Abscess and it was draining as well  He is feeling pressure in face for a while now  Vit D defic:  Patient did have a low vitamin-D previously  Currently, was reviewed and is much better than what it was  He is on supplementation currently  Patient did have a period of Weight loss previously  It was a slow weight loss per patient's report  Nothing specific was going on at that time, just general slow decline  Weight reviewed  Patient does have a history of reflux  Currently using Zantac  Patient does have a history of anxiety and depression listed  This is from [facility]  He reports that he was able to beat this with the help of basketball, and currently does not suffer from depression or anxiety symptoms at all  Patient does have some bronchospasm that is exercise/allergy induced  No current symptoms  The following portions of the patient's history were reviewed and updated as appropriate: allergies, current medications, past family history, past medical history, past social history, past surgical history and problem list       Review of Systems   Constitutional: Negative  HENT:        Per HPI   Eyes: Negative  Respiratory: Negative  Cardiovascular: Negative  Gastrointestinal: Negative  Endocrine: Negative  Genitourinary: Negative  Musculoskeletal: Negative  Skin: Negative  Allergic/Immunologic: Negative  Neurological: Negative  Hematological: Negative  Psychiatric/Behavioral: Negative  Data to review:   Vitamin-D 25 2  Prior vitamin-D was 8  CRP 23 5    White count 14 05  Hemoglobin 15 6, hematocrit 48 8, platelets 797  Helicobacter pylori IgG was 0 61, negative  Labs from June:    Lyme titer: IgG negative, IgM negative    White count 12 57  Objective:    Vitals:    10/09/19 1810   BP: 118/72   BP Location: Left arm   Patient Position: Sitting   Pulse: 88   Weight: 125 kg (275 lb 3 2 oz)   Height: 6' 1 5" (1 867 m)        Physical Exam   Constitutional: He appears well-developed and well-nourished  HENT:   Head: Normocephalic and atraumatic  Neck: Normal range of motion  Neck supple  Cardiovascular: Normal rate, regular rhythm and normal heart sounds  Exam reveals no gallop and no friction rub  No murmur heard  Pulses:       Carotid pulses are 2+ on the right side, and 2+ on the left side  Pulmonary/Chest: Effort normal and breath sounds normal  No respiratory distress  He has no wheezes  He has no rales  Lymphadenopathy:     He has no cervical adenopathy  Nursing note and vitals reviewed  BMI Counseling: Body mass index is 35 82 kg/m²  The BMI is above normal  Nutrition recommendations include reducing portion sizes, decreasing overall calorie intake, 3-5 servings of fruits/vegetables daily, reducing fast food intake, consuming healthier snacks, decreasing soda and/or juice intake, moderation in carbohydrate intake, increasing intake of lean protein, reducing intake of saturated fat and trans fat and reducing intake of cholesterol  Exercise recommendations include exercising 3-5 times per week

## 2019-10-09 NOTE — ASSESSMENT & PLAN NOTE
Patient does have elevated BMI  Recommend concentrate on limiting carbohydrates, increasing exercise, and follow up after that

## 2019-10-09 NOTE — ASSESSMENT & PLAN NOTE
Patient seems to be doing relatively well  He does have reflux symptoms at times, more specifically some irritation of gas buildup in the stomach  Recommend change from Zantac to Pepcid 40 mg  Re-evaluate in the future  Certainly, with weight loss I would expect improvement  Of note, his BMI is not severely abnormal, but the goal still is 25 or less

## 2019-10-09 NOTE — ASSESSMENT & PLAN NOTE
Patient was having significant issue years ago  Currently, he reports that he is not having any issues  He is not currently on medications  This diagnosis was updated from reconciliation, i e  Was a diagnosis listed at [facility]  Will resolved today

## 2019-10-19 ENCOUNTER — HOSPITAL ENCOUNTER (OUTPATIENT)
Dept: CT IMAGING | Facility: HOSPITAL | Age: 39
Discharge: HOME | End: 2019-10-19
Payer: COMMERCIAL

## 2019-10-19 DIAGNOSIS — K04.7 DENTAL ABSCESS: ICD-10-CM

## 2019-10-19 PROCEDURE — 70486 CT MAXILLOFACIAL W/O DYE: CPT

## 2019-10-22 ENCOUNTER — TELEPHONE (OUTPATIENT)
Dept: FAMILY MEDICINE CLINIC | Facility: CLINIC | Age: 39
End: 2019-10-22

## 2020-03-19 DIAGNOSIS — K21.9 GASTROESOPHAGEAL REFLUX DISEASE WITHOUT ESOPHAGITIS: ICD-10-CM

## 2020-03-19 DIAGNOSIS — E55.9 VITAMIN D DEFICIENCY: ICD-10-CM

## 2020-03-19 DIAGNOSIS — Z00.00 PHYSICAL EXAM: Primary | ICD-10-CM

## 2020-03-20 RX ORDER — FAMOTIDINE 40 MG/1
40 TABLET, FILM COATED ORAL DAILY
Qty: 30 TABLET | Refills: 3 | Status: SHIPPED | OUTPATIENT
Start: 2020-03-20 | End: 2020-04-28 | Stop reason: ALTCHOICE

## 2020-03-20 RX ORDER — ALBUTEROL SULFATE 90 UG/1
2 AEROSOL, METERED RESPIRATORY (INHALATION) 4 TIMES DAILY
Qty: 1 INHALER | Refills: 3 | Status: SHIPPED | OUTPATIENT
Start: 2020-03-20 | End: 2020-08-17 | Stop reason: SDUPTHER

## 2020-04-28 ENCOUNTER — OFFICE VISIT (OUTPATIENT)
Dept: FAMILY MEDICINE CLINIC | Facility: CLINIC | Age: 40
End: 2020-04-28
Payer: COMMERCIAL

## 2020-04-28 VITALS
TEMPERATURE: 98.1 F | SYSTOLIC BLOOD PRESSURE: 132 MMHG | DIASTOLIC BLOOD PRESSURE: 76 MMHG | BODY MASS INDEX: 34.14 KG/M2 | WEIGHT: 266 LBS | HEIGHT: 74 IN

## 2020-04-28 DIAGNOSIS — E55.9 VITAMIN D DEFICIENCY: ICD-10-CM

## 2020-04-28 DIAGNOSIS — M16.12 PRIMARY OSTEOARTHRITIS OF LEFT HIP: Primary | ICD-10-CM

## 2020-04-28 DIAGNOSIS — E66.09 CLASS 1 OBESITY DUE TO EXCESS CALORIES WITHOUT SERIOUS COMORBIDITY WITH BODY MASS INDEX (BMI) OF 34.0 TO 34.9 IN ADULT: ICD-10-CM

## 2020-04-28 DIAGNOSIS — K21.9 GASTROESOPHAGEAL REFLUX DISEASE WITHOUT ESOPHAGITIS: ICD-10-CM

## 2020-04-28 DIAGNOSIS — E78.2 MIXED HYPERLIPIDEMIA: ICD-10-CM

## 2020-04-28 PROBLEM — M16.9 OSTEOARTHRITIS OF HIP: Status: ACTIVE | Noted: 2020-04-28

## 2020-04-28 PROCEDURE — 1036F TOBACCO NON-USER: CPT | Performed by: FAMILY MEDICINE

## 2020-04-28 PROCEDURE — 99213 OFFICE O/P EST LOW 20 MIN: CPT | Performed by: FAMILY MEDICINE

## 2020-04-28 PROCEDURE — 3008F BODY MASS INDEX DOCD: CPT | Performed by: FAMILY MEDICINE

## 2020-04-28 RX ORDER — OMEPRAZOLE 20 MG/1
20 CAPSULE, DELAYED RELEASE ORAL DAILY
COMMUNITY
End: 2021-01-04 | Stop reason: DRUGHIGH

## 2020-06-01 ENCOUNTER — TELEPHONE (OUTPATIENT)
Dept: FAMILY MEDICINE CLINIC | Facility: CLINIC | Age: 40
End: 2020-06-01

## 2020-08-17 DIAGNOSIS — Z00.00 PHYSICAL EXAM: ICD-10-CM

## 2020-08-18 RX ORDER — ALBUTEROL SULFATE 90 UG/1
2 AEROSOL, METERED RESPIRATORY (INHALATION) 4 TIMES DAILY
Qty: 1 INHALER | Refills: 3 | Status: SHIPPED | OUTPATIENT
Start: 2020-08-18

## 2021-01-04 ENCOUNTER — OFFICE VISIT (OUTPATIENT)
Dept: FAMILY MEDICINE CLINIC | Facility: CLINIC | Age: 41
End: 2021-01-04
Payer: COMMERCIAL

## 2021-01-04 VITALS
SYSTOLIC BLOOD PRESSURE: 142 MMHG | TEMPERATURE: 98.2 F | DIASTOLIC BLOOD PRESSURE: 90 MMHG | WEIGHT: 262.4 LBS | RESPIRATION RATE: 16 BRPM | HEART RATE: 69 BPM | OXYGEN SATURATION: 96 % | BODY MASS INDEX: 33.67 KG/M2 | HEIGHT: 74 IN

## 2021-01-04 DIAGNOSIS — K59.00 CONSTIPATION, UNSPECIFIED CONSTIPATION TYPE: Primary | ICD-10-CM

## 2021-01-04 DIAGNOSIS — F41.8 ANXIETY ABOUT HEALTH: ICD-10-CM

## 2021-01-04 DIAGNOSIS — E55.9 VITAMIN D DEFICIENCY: ICD-10-CM

## 2021-01-04 DIAGNOSIS — E66.09 CLASS 1 OBESITY DUE TO EXCESS CALORIES WITHOUT SERIOUS COMORBIDITY WITH BODY MASS INDEX (BMI) OF 34.0 TO 34.9 IN ADULT: ICD-10-CM

## 2021-01-04 DIAGNOSIS — L85.3 DRY SKIN DERMATITIS: ICD-10-CM

## 2021-01-04 DIAGNOSIS — Z83.3 FAMILY HISTORY OF DIABETES MELLITUS: ICD-10-CM

## 2021-01-04 DIAGNOSIS — E78.00 HYPERCHOLESTEREMIA: ICD-10-CM

## 2021-01-04 DIAGNOSIS — K21.00 GASTROESOPHAGEAL REFLUX DISEASE WITH ESOPHAGITIS WITHOUT HEMORRHAGE: ICD-10-CM

## 2021-01-04 DIAGNOSIS — R68.82 LIBIDO, DECREASED: ICD-10-CM

## 2021-01-04 PROCEDURE — 3008F BODY MASS INDEX DOCD: CPT | Performed by: FAMILY MEDICINE

## 2021-01-04 PROCEDURE — 3725F SCREEN DEPRESSION PERFORMED: CPT | Performed by: FAMILY MEDICINE

## 2021-01-04 PROCEDURE — 99204 OFFICE O/P NEW MOD 45 MIN: CPT | Performed by: FAMILY MEDICINE

## 2021-01-04 PROCEDURE — 1036F TOBACCO NON-USER: CPT | Performed by: FAMILY MEDICINE

## 2021-01-04 RX ORDER — OMEPRAZOLE 40 MG/1
40 CAPSULE, DELAYED RELEASE ORAL
Qty: 90 CAPSULE | Refills: 1 | Status: SHIPPED | OUTPATIENT
Start: 2021-01-04

## 2021-01-04 NOTE — PROGRESS NOTES
Chief Complaint   Patient presents with   [address]     new patient, would like to have blood work done   Yazmin Gonzalez Palpitations     when patient feels like his heart races when he is constipated or has trouble passing stool    Heartburn    Rash     on face    Constipation       Assessment/Plan: Too many CHO's, lacking protein in diet  Drink 3 - 4  16 oz bottles of water daily, almond mild is at treat or eat with meals, not hydration  Discussed proper nutrition, gave diet sheet of foods to eat and avoid or cut back on  PHQ-9 Depression Screening    PHQ-9:   Frequency of the following problems over the past two weeks:      Little interest or pleasure in doing things: 0 - not at all  Feeling down, depressed, or hopeless: 0 - not at all  PHQ-2 Score: 0       BMI Counseling: Body mass index is 34 15 kg/m²  The BMI is above normal  Nutrition recommendations include reducing portion sizes, consuming healthier snacks, decreasing soda and/or juice intake, moderation in carbohydrate intake and increasing intake of lean protein  Diagnoses and all orders for this visit:    Constipation, unspecified constipation type    Gastroesophageal reflux disease with esophagitis without hemorrhage  -     omeprazole (PriLOSEC) 40 MG capsule; Take 1 capsule (40 mg total) by mouth daily before breakfast    Dry skin dermatitis    Vitamin D deficiency  -     Vitamin D 25 hydroxy; Future    Libido, decreased  -     Testosterone; Future    Hypercholesteremia  -     Basic metabolic panel; Future  -     CBC and Platelet; Future  -     Hepatic function panel; Future  -     Lipid panel; Future    Family history of diabetes mellitus    Class 1 obesity due to excess calories without serious comorbidity with body mass index (BMI) of 34 0 to 34 9 in adult    Anxiety about health          Subjective:      Patient ID: Vandana Lima is a 41 y o  male  Establish, moved from [facility] patient  Here for several reasons    Bowel habit changes past year involving constipation/bloating  Acid reflux, Vitamin D deficiency  Concerned about testosterone level  SH: Neg tob, social [place]  Poor hydration, drinks mainly [place] milk  Eats a lot of spicy foods  PSH: Reviewed  PMH: Reviewed  The following portions of the patient's history were reviewed and updated as appropriate: allergies, current medications, past family history, past medical history, past social history, past surgical history and problem list     Review of Systems   Constitutional: Negative  HENT: Negative  Eyes: Negative  Respiratory: Negative  Cardiovascular: Negative  Gastrointestinal: Positive for constipation  Genitourinary: Negative  Musculoskeletal: Negative  Skin: Negative  Neurological: Negative  Psychiatric/Behavioral: Negative  Objective:      /90 (BP Location: Left arm, Patient Position: Sitting, Cuff Size: Standard)   Pulse 69   Temp 98 2 °F (36 8 °C) (Tympanic)   Resp 16   Ht 6' 1 5" (1 867 m)   Wt 119 kg (262 lb 6 4 oz)   SpO2 96%   BMI 34 15 kg/m²       Current Outpatient Medications:     albuterol (ProAir HFA) 90 mcg/act inhaler, Inhale 2 puffs 4 (four) times a day, Disp: 1 Inhaler, Rfl: 3    Cholecalciferol (VITAMIN D-3) 125 MCG (5000 UT) TABS, Take 5,000 mg by mouth daily, Disp: 30 tablet, Rfl: 3    omeprazole (PriLOSEC) 40 MG capsule, Take 1 capsule (40 mg total) by mouth daily before breakfast, Disp: 90 capsule, Rfl: 1     Physical Exam  Constitutional:       Appearance: He is well-developed  HENT:      Head: Normocephalic and atraumatic  Right Ear: Tympanic membrane, ear canal and external ear normal       Left Ear: Tympanic membrane, ear canal and external ear normal       Nose: Nose normal       Mouth/Throat:      Mouth: Mucous membranes are moist       Pharynx: Oropharynx is clear  Eyes:      Extraocular Movements: Extraocular movements intact        Conjunctiva/sclera: Conjunctivae normal  Pupils: Pupils are equal, round, and reactive to light  Neck:      Musculoskeletal: Normal range of motion and neck supple  Cardiovascular:      Rate and Rhythm: Normal rate and regular rhythm  Heart sounds: Normal heart sounds  Pulmonary:      Effort: Pulmonary effort is normal       Breath sounds: Normal breath sounds  Abdominal:      General: Bowel sounds are normal       Palpations: Abdomen is soft  Skin:     General: Skin is warm and dry  Neurological:      Mental Status: He is alert and oriented to person, place, and time  Deep Tendon Reflexes: Reflexes are normal and symmetric  Psychiatric:         Mood and Affect: Mood normal          Behavior: Behavior normal          Thought Content:  Thought content normal          Judgment: Judgment normal

## 2021-01-06 ENCOUNTER — TRANSCRIBE ORDERS (OUTPATIENT)
Dept: LAB | Facility: CLINIC | Age: 41
End: 2021-01-06

## 2021-01-06 ENCOUNTER — LAB (OUTPATIENT)
Dept: LAB | Facility: CLINIC | Age: 41
End: 2021-01-06
Payer: COMMERCIAL

## 2021-01-06 DIAGNOSIS — E78.00 HYPERCHOLESTEREMIA: ICD-10-CM

## 2021-01-06 DIAGNOSIS — E55.9 VITAMIN D DEFICIENCY: ICD-10-CM

## 2021-01-06 DIAGNOSIS — R68.82 LIBIDO, DECREASED: ICD-10-CM

## 2021-01-06 LAB
25(OH)D3 SERPL-MCNC: 19 NG/ML (ref 30–100)
ALBUMIN SERPL BCP-MCNC: 3.8 G/DL (ref 3.5–5)
ALP SERPL-CCNC: 116 U/L (ref 46–116)
ALT SERPL W P-5'-P-CCNC: 62 U/L (ref 12–78)
ANION GAP SERPL CALCULATED.3IONS-SCNC: 4 MMOL/L (ref 4–13)
AST SERPL W P-5'-P-CCNC: 39 U/L (ref 5–45)
BILIRUB DIRECT SERPL-MCNC: 0.15 MG/DL (ref 0–0.2)
BILIRUB SERPL-MCNC: 0.56 MG/DL (ref 0.2–1)
BUN SERPL-MCNC: 14 MG/DL (ref 5–25)
CALCIUM SERPL-MCNC: 9.5 MG/DL (ref 8.3–10.1)
CHLORIDE SERPL-SCNC: 109 MMOL/L (ref 100–108)
CHOLEST SERPL-MCNC: 124 MG/DL (ref 50–200)
CO2 SERPL-SCNC: 27 MMOL/L (ref 21–32)
CREAT SERPL-MCNC: 0.71 MG/DL (ref 0.6–1.3)
ERYTHROCYTE [DISTWIDTH] IN BLOOD BY AUTOMATED COUNT: 13.4 % (ref 11.6–15.1)
GFR SERPL CREATININE-BSD FRML MDRD: 134 ML/MIN/1.73SQ M
GLUCOSE P FAST SERPL-MCNC: 85 MG/DL (ref 65–99)
HCT VFR BLD AUTO: 48.4 % (ref 36.5–49.3)
HDLC SERPL-MCNC: 22 MG/DL
HGB BLD-MCNC: 15.1 G/DL (ref 12–17)
LDLC SERPL CALC-MCNC: 68 MG/DL (ref 0–100)
MCH RBC QN AUTO: 30.3 PG (ref 26.8–34.3)
MCHC RBC AUTO-ENTMCNC: 31.2 G/DL (ref 31.4–37.4)
MCV RBC AUTO: 97 FL (ref 82–98)
NONHDLC SERPL-MCNC: 102 MG/DL
PLATELET # BLD AUTO: 411 THOUSANDS/UL (ref 149–390)
PMV BLD AUTO: 10.3 FL (ref 8.9–12.7)
POTASSIUM SERPL-SCNC: 4.1 MMOL/L (ref 3.5–5.3)
PROT SERPL-MCNC: 8.4 G/DL (ref 6.4–8.2)
RBC # BLD AUTO: 4.98 MILLION/UL (ref 3.88–5.62)
SODIUM SERPL-SCNC: 140 MMOL/L (ref 136–145)
TESTOST SERPL-MCNC: 171 NG/DL (ref 113–1065)
TRIGL SERPL-MCNC: 168 MG/DL
WBC # BLD AUTO: 14.2 THOUSAND/UL (ref 4.31–10.16)

## 2021-01-06 PROCEDURE — 84403 ASSAY OF TOTAL TESTOSTERONE: CPT

## 2021-01-06 PROCEDURE — 80048 BASIC METABOLIC PNL TOTAL CA: CPT

## 2021-01-06 PROCEDURE — 80061 LIPID PANEL: CPT

## 2021-01-06 PROCEDURE — 82306 VITAMIN D 25 HYDROXY: CPT

## 2021-01-06 PROCEDURE — 85027 COMPLETE CBC AUTOMATED: CPT

## 2021-01-06 PROCEDURE — 80076 HEPATIC FUNCTION PANEL: CPT

## 2021-01-06 PROCEDURE — 36415 COLL VENOUS BLD VENIPUNCTURE: CPT

## 2021-01-07 ENCOUNTER — TELEPHONE (OUTPATIENT)
Dept: FAMILY MEDICINE CLINIC | Facility: CLINIC | Age: 41
End: 2021-01-07

## 2021-01-08 NOTE — TELEPHONE ENCOUNTER
Patient received results and was instructed to take Vitamin D otc 6993-0434 IU daily and limit processed foods to help with triglycerides  Patient verbalized understanding

## 2021-03-10 DIAGNOSIS — Z23 ENCOUNTER FOR IMMUNIZATION: ICD-10-CM

## 2022-09-28 ENCOUNTER — RA CDI HCC (OUTPATIENT)
Dept: OTHER | Facility: HOSPITAL | Age: 42
End: 2022-09-28

## 2022-09-28 NOTE — PROGRESS NOTES
[facility]  coding opportunities       Chart reviewed, no opportunity found: CHART REVIEWED, NO OPPORTUNITY FOUND        Patients Insurance        Commercial Insurance: [address] [place] [address]

## 2023-01-16 ENCOUNTER — OFFICE VISIT (OUTPATIENT)
Dept: FAMILY MEDICINE CLINIC | Facility: CLINIC | Age: 43
End: 2023-01-16

## 2023-01-16 VITALS
HEIGHT: 74 IN | TEMPERATURE: 98.6 F | SYSTOLIC BLOOD PRESSURE: 128 MMHG | HEART RATE: 78 BPM | WEIGHT: 258 LBS | BODY MASS INDEX: 33.11 KG/M2 | OXYGEN SATURATION: 98 % | DIASTOLIC BLOOD PRESSURE: 88 MMHG

## 2023-01-16 DIAGNOSIS — J45.990 EXERCISE INDUCED BRONCHOSPASM: ICD-10-CM

## 2023-01-16 DIAGNOSIS — E78.2 MIXED HYPERLIPIDEMIA: ICD-10-CM

## 2023-01-16 DIAGNOSIS — R63.4 WEIGHT LOSS: ICD-10-CM

## 2023-01-16 DIAGNOSIS — E55.9 VITAMIN D DEFICIENCY: ICD-10-CM

## 2023-01-16 DIAGNOSIS — D72.829 LEUKOCYTOSIS, UNSPECIFIED TYPE: ICD-10-CM

## 2023-01-16 DIAGNOSIS — K21.9 GASTROESOPHAGEAL REFLUX DISEASE WITHOUT ESOPHAGITIS: Primary | ICD-10-CM

## 2023-01-16 RX ORDER — PANTOPRAZOLE SODIUM 40 MG/1
40 TABLET, DELAYED RELEASE ORAL
Qty: 90 TABLET | Refills: 3 | Status: SHIPPED | OUTPATIENT
Start: 2023-01-16

## 2023-01-16 NOTE — PROGRESS NOTES
Assessment/Plan: Labs and records reviewed  Patient will go for further laboratory studies for chronic conditions as below  Patient will start pantoprazole for reflux symptoms at this time  Patient will use albuterol as needed for exercise-induced spasm  Patient will have labs to reevaluate weight loss as well as leukocytosis  Patient will follow-up in 6 months  Diagnoses and all orders for this visit:    Gastroesophageal reflux disease without esophagitis  -     CBC and differential; Future  -     Comprehensive metabolic panel; Future  -     Lipid panel; Future  -     TSH, 3rd generation with Free T4 reflex; Future  -     Hemoglobin A1C; Future  -     pantoprazole (PROTONIX) 40 mg tablet; Take 1 tablet (40 mg total) by mouth daily before breakfast    Vitamin D deficiency  -     Vitamin D 25 hydroxy; Future    Leukocytosis, unspecified type  -     CBC and differential; Future  -     Comprehensive metabolic panel; Future  -     Lipid panel; Future  -     TSH, 3rd generation with Free T4 reflex; Future  -     Hemoglobin A1C; Future    Mixed hyperlipidemia  -     CBC and differential; Future  -     Comprehensive metabolic panel; Future  -     Lipid panel; Future  -     TSH, 3rd generation with Free T4 reflex; Future  -     Hemoglobin A1C; Future    Exercise induced bronchospasm    Weight loss  -     C-reactive protein; Future    Other orders  -     VITAMIN D PO; Take by mouth            Subjective:        Patient ID: Ky Ao is a 38 y o  male  Patient is here as a new patient to establish care  Past medical history surgical history allergies medication family history and social history are reviewed at present time  Patient with history of low vitamin D  Patient is feeling tired  Patient has noticed rash on face  No change in foods or lotions or topical agents being used at this time  No substernal chest pain other than reflux symptoms    Patient has used omeprazole in the past but not presently  Patient has noticed some change in bowel habits based on eating habits  Urination is normal         The following portions of the patient's history were reviewed and updated as appropriate: allergies, current medications, past family history, past medical history, past social history, past surgical history and problem list       Review of Systems   Constitutional: Negative  HENT: Negative  Eyes: Negative  Respiratory: Negative  Cardiovascular: Negative  Gastrointestinal:        GERD symptoms   Endocrine: Negative  Genitourinary: Negative  Musculoskeletal: Negative  Skin: Positive for rash  Allergic/Immunologic: Negative  Neurological: Negative  Hematological: Negative  Psychiatric/Behavioral: Negative  Objective:      BMI Counseling: Body mass index is 33 58 kg/m²  The BMI is above normal  Nutrition recommendations include consuming healthier snacks  Exercise recommendations include moderate physical activity 150 minutes/week  Rationale for BMI follow-up plan is due to patient being overweight or obese  /88 (BP Location: Left arm, Patient Position: Sitting, Cuff Size: Standard)   Pulse 78   Temp 98 6 °F (37 °C) (Temporal)   Ht 6' 1 5" (1 867 m)   Wt 117 kg (258 lb)   SpO2 98%   BMI 33 58 kg/m²          Physical Exam  Vitals and nursing note reviewed  Constitutional:       General: He is not in acute distress  Appearance: Normal appearance  He is not ill-appearing, toxic-appearing or diaphoretic  HENT:      Head: Normocephalic and atraumatic  Right Ear: Tympanic membrane, ear canal and external ear normal  There is no impacted cerumen  Left Ear: Tympanic membrane, ear canal and external ear normal  There is no impacted cerumen  Nose: Nose normal  No congestion or rhinorrhea  Mouth/Throat:      Mouth: Mucous membranes are moist       Pharynx: No oropharyngeal exudate or posterior oropharyngeal erythema  Eyes:      General: No scleral icterus  Right eye: No discharge  Left eye: No discharge  Extraocular Movements: Extraocular movements intact  Conjunctiva/sclera: Conjunctivae normal       Pupils: Pupils are equal, round, and reactive to light  Neck:      Vascular: No carotid bruit  Cardiovascular:      Rate and Rhythm: Normal rate and regular rhythm  Pulses: Normal pulses  Heart sounds: Normal heart sounds  No murmur heard  No friction rub  No gallop  Pulmonary:      Effort: Pulmonary effort is normal  No respiratory distress  Breath sounds: Normal breath sounds  No stridor  No wheezing, rhonchi or rales  Chest:      Chest wall: No tenderness  Abdominal:      General: Abdomen is flat  Bowel sounds are normal  There is no distension  Palpations: Abdomen is soft  Tenderness: There is no abdominal tenderness  There is no guarding or rebound  Musculoskeletal:         General: No swelling, tenderness, deformity or signs of injury  Normal range of motion  Cervical back: Normal range of motion and neck supple  No rigidity  No muscular tenderness  Right lower leg: No edema  Left lower leg: No edema  Lymphadenopathy:      Cervical: No cervical adenopathy  Skin:     General: Skin is warm and dry  Capillary Refill: Capillary refill takes less than 2 seconds  Coloration: Skin is not jaundiced  Findings: Rash present  No bruising, erythema or lesion  Neurological:      Mental Status: He is alert and oriented to person, place, and time  Mental status is at baseline  Cranial Nerves: No cranial nerve deficit  Sensory: No sensory deficit  Motor: No weakness  Coordination: Coordination normal       Gait: Gait normal    Psychiatric:         Mood and Affect: Mood normal          Behavior: Behavior normal          Thought Content:  Thought content normal          Judgment: Judgment normal

## 2023-01-17 ENCOUNTER — LAB (OUTPATIENT)
Dept: LAB | Facility: MEDICAL CENTER | Age: 43
End: 2023-01-17

## 2023-01-17 DIAGNOSIS — D72.829 LEUKOCYTOSIS, UNSPECIFIED TYPE: ICD-10-CM

## 2023-01-17 DIAGNOSIS — E78.2 MIXED HYPERLIPIDEMIA: ICD-10-CM

## 2023-01-17 DIAGNOSIS — K21.9 GASTROESOPHAGEAL REFLUX DISEASE WITHOUT ESOPHAGITIS: ICD-10-CM

## 2023-01-17 DIAGNOSIS — E55.9 VITAMIN D DEFICIENCY: ICD-10-CM

## 2023-01-17 DIAGNOSIS — R63.4 WEIGHT LOSS: ICD-10-CM

## 2023-01-17 LAB
25(OH)D3 SERPL-MCNC: 21 NG/ML (ref 30–100)
ALBUMIN SERPL BCP-MCNC: 3.8 G/DL (ref 3.5–5)
ALP SERPL-CCNC: 105 U/L (ref 46–116)
ALT SERPL W P-5'-P-CCNC: 68 U/L (ref 12–78)
ANION GAP SERPL CALCULATED.3IONS-SCNC: 3 MMOL/L (ref 4–13)
AST SERPL W P-5'-P-CCNC: 40 U/L (ref 5–45)
BASOPHILS # BLD AUTO: 0.04 THOUSANDS/ÂΜL (ref 0–0.1)
BASOPHILS NFR BLD AUTO: 0 % (ref 0–1)
BILIRUB SERPL-MCNC: 0.38 MG/DL (ref 0.2–1)
BUN SERPL-MCNC: 12 MG/DL (ref 5–25)
CALCIUM SERPL-MCNC: 9.6 MG/DL (ref 8.3–10.1)
CHLORIDE SERPL-SCNC: 108 MMOL/L (ref 96–108)
CHOLEST SERPL-MCNC: 130 MG/DL
CO2 SERPL-SCNC: 28 MMOL/L (ref 21–32)
CREAT SERPL-MCNC: 0.72 MG/DL (ref 0.6–1.3)
CRP SERPL QL: 17.3 MG/L
EOSINOPHIL # BLD AUTO: 0.45 THOUSAND/ÂΜL (ref 0–0.61)
EOSINOPHIL NFR BLD AUTO: 4 % (ref 0–6)
ERYTHROCYTE [DISTWIDTH] IN BLOOD BY AUTOMATED COUNT: 13.5 % (ref 11.6–15.1)
EST. AVERAGE GLUCOSE BLD GHB EST-MCNC: 103 MG/DL
GFR SERPL CREATININE-BSD FRML MDRD: 113 ML/MIN/1.73SQ M
GLUCOSE P FAST SERPL-MCNC: 78 MG/DL (ref 65–99)
HBA1C MFR BLD: 5.2 %
HCT VFR BLD AUTO: 43.8 % (ref 36.5–49.3)
HDLC SERPL-MCNC: 26 MG/DL
HGB BLD-MCNC: 14.1 G/DL (ref 12–17)
IMM GRANULOCYTES # BLD AUTO: 0.07 THOUSAND/UL (ref 0–0.2)
IMM GRANULOCYTES NFR BLD AUTO: 1 % (ref 0–2)
LDLC SERPL CALC-MCNC: 71 MG/DL (ref 0–100)
LYMPHOCYTES # BLD AUTO: 3.04 THOUSANDS/ÂΜL (ref 0.6–4.47)
LYMPHOCYTES NFR BLD AUTO: 26 % (ref 14–44)
MCH RBC QN AUTO: 30.4 PG (ref 26.8–34.3)
MCHC RBC AUTO-ENTMCNC: 32.2 G/DL (ref 31.4–37.4)
MCV RBC AUTO: 94 FL (ref 82–98)
MONOCYTES # BLD AUTO: 1.12 THOUSAND/ÂΜL (ref 0.17–1.22)
MONOCYTES NFR BLD AUTO: 10 % (ref 4–12)
NEUTROPHILS # BLD AUTO: 7.09 THOUSANDS/ÂΜL (ref 1.85–7.62)
NEUTS SEG NFR BLD AUTO: 59 % (ref 43–75)
NONHDLC SERPL-MCNC: 104 MG/DL
NRBC BLD AUTO-RTO: 0 /100 WBCS
PLATELET # BLD AUTO: 381 THOUSANDS/UL (ref 149–390)
PMV BLD AUTO: 10.4 FL (ref 8.9–12.7)
POTASSIUM SERPL-SCNC: 4 MMOL/L (ref 3.5–5.3)
PROT SERPL-MCNC: 8.6 G/DL (ref 6.4–8.4)
RBC # BLD AUTO: 4.64 MILLION/UL (ref 3.88–5.62)
SODIUM SERPL-SCNC: 139 MMOL/L (ref 135–147)
T4 FREE SERPL-MCNC: 0.81 NG/DL (ref 0.76–1.46)
TRIGL SERPL-MCNC: 167 MG/DL
TSH SERPL DL<=0.05 MIU/L-ACNC: 5.15 UIU/ML (ref 0.45–4.5)
WBC # BLD AUTO: 11.81 THOUSAND/UL (ref 4.31–10.16)

## 2023-01-18 DIAGNOSIS — R79.89 ELEVATED TSH: ICD-10-CM

## 2023-01-18 DIAGNOSIS — E55.9 VITAMIN D DEFICIENCY: Primary | ICD-10-CM

## 2023-02-22 ENCOUNTER — APPOINTMENT (OUTPATIENT)
Dept: LAB | Facility: MEDICAL CENTER | Age: 43
End: 2023-02-22

## 2023-02-22 DIAGNOSIS — E55.9 VITAMIN D DEFICIENCY: ICD-10-CM

## 2023-02-22 DIAGNOSIS — R79.89 ELEVATED TSH: ICD-10-CM

## 2023-02-28 ENCOUNTER — APPOINTMENT (OUTPATIENT)
Dept: LAB | Facility: MEDICAL CENTER | Age: 43
End: 2023-02-28

## 2023-02-28 LAB
25(OH)D3 SERPL-MCNC: 36.1 NG/ML (ref 30–100)
TSH SERPL DL<=0.05 MIU/L-ACNC: 2.96 UIU/ML (ref 0.45–4.5)

## 2023-03-01 LAB
ALBUMIN SERPL ELPH-MCNC: 4.08 G/DL (ref 3.5–5)
ALBUMIN SERPL ELPH-MCNC: 49.8 % (ref 52–65)
ALPHA1 GLOB SERPL ELPH-MCNC: 0.31 G/DL (ref 0.1–0.4)
ALPHA1 GLOB SERPL ELPH-MCNC: 3.8 % (ref 2.5–5)
ALPHA2 GLOB SERPL ELPH-MCNC: 0.8 G/DL (ref 0.4–1.2)
ALPHA2 GLOB SERPL ELPH-MCNC: 9.8 % (ref 7–13)
BETA GLOB ABNORMAL SERPL ELPH-MCNC: 0.62 G/DL (ref 0.4–0.8)
BETA1 GLOB SERPL ELPH-MCNC: 7.6 % (ref 5–13)
BETA2 GLOB SERPL ELPH-MCNC: 8.5 % (ref 2–8)
BETA2+GAMMA GLOB SERPL ELPH-MCNC: 0.7 G/DL (ref 0.2–0.5)
GAMMA GLOB ABNORMAL SERPL ELPH-MCNC: 1.68 G/DL (ref 0.5–1.6)
GAMMA GLOB SERPL ELPH-MCNC: 20.5 % (ref 12–22)
IGG/ALB SER: 0.99 {RATIO} (ref 1.1–1.8)
PROT PATTERN SERPL ELPH-IMP: ABNORMAL
PROT SERPL-MCNC: 8.2 G/DL (ref 6.4–8.2)

## 2023-05-01 DIAGNOSIS — Z00.00 PHYSICAL EXAM: ICD-10-CM

## 2023-05-01 DIAGNOSIS — K21.9 GASTROESOPHAGEAL REFLUX DISEASE WITHOUT ESOPHAGITIS: ICD-10-CM

## 2023-05-01 RX ORDER — ALBUTEROL SULFATE 90 UG/1
2 AEROSOL, METERED RESPIRATORY (INHALATION) 4 TIMES DAILY
Qty: 8 G | Refills: 3 | Status: SHIPPED | OUTPATIENT
Start: 2023-05-01

## 2023-05-01 RX ORDER — PANTOPRAZOLE SODIUM 40 MG/1
40 TABLET, DELAYED RELEASE ORAL
Qty: 90 TABLET | Refills: 0 | Status: SHIPPED | OUTPATIENT
Start: 2023-05-01

## 2023-05-16 ENCOUNTER — HOSPITAL ENCOUNTER (EMERGENCY)
Facility: HOSPITAL | Age: 43
Discharge: HOME | End: 2023-05-16
Attending: EMERGENCY MEDICINE | Admitting: EMERGENCY MEDICINE

## 2023-05-16 VITALS
DIASTOLIC BLOOD PRESSURE: 97 MMHG | SYSTOLIC BLOOD PRESSURE: 208 MMHG | TEMPERATURE: 97.6 F | HEART RATE: 97 BPM | WEIGHT: 259.92 LBS | RESPIRATION RATE: 18 BRPM | OXYGEN SATURATION: 96 % | BODY MASS INDEX: 33.83 KG/M2

## 2023-05-16 DIAGNOSIS — R21 RASH: Primary | ICD-10-CM

## 2023-05-17 NOTE — DISCHARGE INSTRUCTIONS
Please follow up with dermatology for further management of your rash  They will call in the next couple business days to set up an appointment  Follow up with your PCP as scheduled for other health maintenance

## 2023-05-17 NOTE — ED PROVIDER NOTES
History  Chief Complaint   Patient presents with   • Rash     Pt reports rash on face for about a year  Pt denies itching, but reports swelling  Pt denies any new medications  37 y/o M presents for evaluation of facial rash x 1 year  Also admits to some mild facial tightness  Denies difficulty breathing, throat swelling, difficulty swallowing secretions, fevers, nausea, vomiting, diarrhea, abdominal pain  Denies PMH of cancer, clotting disorders, STDs, autoimmune disorders  Denies new medication changes, travel, recent illness, sick contacts  Prior to Admission Medications   Prescriptions Last Dose Informant Patient Reported? Taking? VITAMIN D PO   Yes Yes   Sig: Take by mouth   albuterol (ProAir HFA) 90 mcg/act inhaler   No No   Sig: Inhale 2 puffs 4 (four) times a day   pantoprazole (PROTONIX) 40 mg tablet   No Yes   Sig: Take 1 tablet (40 mg total) by mouth daily before breakfast      Facility-Administered Medications: None       Past Medical History:   Diagnosis Date   • Generalized anxiety disorder 3/29/2018   • No known health problems        Past Surgical History:   Procedure Laterality Date   • NO PAST SURGERIES         Family History   Problem Relation Age of Onset   • Hypertension Mother    • Diabetes Father         Mellitus   • Hypertension Father      I have reviewed and agree with the history as documented  E-Cigarette/Vaping   • E-Cigarette Use Never User      E-Cigarette/Vaping Substances     Social History     Tobacco Use   • Smoking status: Never   • Smokeless tobacco: Never   Vaping Use   • Vaping Use: Never used   Substance Use Topics   • Alcohol use: Yes     Comment: social   • Drug use: No       Review of Systems   Constitutional: Negative for chills and fever  HENT: Negative for ear pain and sore throat  Facial tightness   Eyes: Negative for pain and visual disturbance  Respiratory: Negative for cough and shortness of breath      Cardiovascular: Negative for chest pain and palpitations  Gastrointestinal: Negative for abdominal pain and vomiting  Genitourinary: Negative for dysuria and hematuria  Musculoskeletal: Negative for arthralgias and back pain  Skin: Positive for rash  Negative for color change  Neurological: Negative for seizures and syncope  All other systems reviewed and are negative  Physical Exam  Physical Exam  Vitals and nursing note reviewed  Constitutional:       General: He is not in acute distress  Appearance: He is well-developed  HENT:      Head: Normocephalic and atraumatic  Comments: Petechial rash as demonstrated above  Eyes:      Conjunctiva/sclera: Conjunctivae normal    Cardiovascular:      Rate and Rhythm: Normal rate and regular rhythm  Heart sounds: No murmur heard  Pulmonary:      Effort: Pulmonary effort is normal  No respiratory distress  Breath sounds: Normal breath sounds  Abdominal:      Palpations: Abdomen is soft  Tenderness: There is no abdominal tenderness  Musculoskeletal:         General: No swelling  Cervical back: Neck supple  Skin:     General: Skin is warm and dry  Capillary Refill: Capillary refill takes less than 2 seconds  Neurological:      Mental Status: He is alert     Psychiatric:         Mood and Affect: Mood normal          Vital Signs  ED Triage Vitals [05/16/23 1905]   Temperature Pulse Respirations Blood Pressure SpO2   97 6 °F (36 4 °C) 97 18 (!) 208/97 96 %      Temp Source Heart Rate Source Patient Position - Orthostatic VS BP Location FiO2 (%)   Oral Monitor Sitting -- --      Pain Score       --           Vitals:    05/16/23 1905   BP: (!) 208/97   Pulse: 97   Patient Position - Orthostatic VS: Sitting         Visual Acuity      ED Medications  Medications - No data to display    Diagnostic Studies  Results Reviewed     None                 No orders to display              Procedures  Procedures         ED Course SBIRT 22yo+    Flowsheet Row Most Recent Value   Initial Alcohol Screen: US AUDIT-C     1  How often do you have a drink containing alcohol? 3 Filed at: 05/16/2023 1919   2  How many drinks containing alcohol do you have on a typical day you are drinking? 1 Filed at: 05/16/2023 1919   3a  Male UNDER 65: How often do you have five or more drinks on one occasion? 0 Filed at: 05/16/2023 1919   Audit-C Score 4 Filed at: 05/16/2023 1919   ROBERTO CARLOS: How many times in the past year have you    Used an illegal drug or used a prescription medication for non-medical reasons? Never Filed at: 05/16/2023 Viktoriya Vasquez  40-year-old male presents for evaluation of facial rash x1 year  Exam: Petechial rash noted on forehead and cheeks  Not raised, not painful, not itchy  No rash elsewhere  Airways patent, nonerythematous  Uncertain origin of this rash  However, patient is hemodynamically stable, breathing normally, tolerating secretions  Stable for outpatient follow-up with dermatology  We will submit referral   Educated patient on possible etiologies of his condition, recommended follow-up with dermatology as directed, return to ER if condition worsens  Patient expresses understanding of the condition, treatment plan, follow-up instructions, and return precautions  Disposition  Final diagnoses:   None     ED Disposition     None      Follow-up Information    None         Patient's Medications   Discharge Prescriptions    No medications on file       No discharge procedures on file      PDMP Review     None          ED Provider  Electronically Signed by           Trini Pryor PA-C  05/17/23 7965

## 2023-07-14 ENCOUNTER — RA CDI HCC (OUTPATIENT)
Dept: OTHER | Facility: HOSPITAL | Age: 43
End: 2023-07-14

## 2023-12-18 ENCOUNTER — HOSPITAL ENCOUNTER (EMERGENCY)
Facility: HOSPITAL | Age: 43
Discharge: HOME | End: 2023-12-18
Attending: EMERGENCY MEDICINE | Admitting: EMERGENCY MEDICINE
Payer: COMMERCIAL

## 2023-12-18 VITALS
RESPIRATION RATE: 18 BRPM | DIASTOLIC BLOOD PRESSURE: 80 MMHG | TEMPERATURE: 98.2 F | SYSTOLIC BLOOD PRESSURE: 126 MMHG | HEART RATE: 86 BPM | OXYGEN SATURATION: 99 %

## 2023-12-18 DIAGNOSIS — R51.9 HEADACHE: ICD-10-CM

## 2023-12-18 DIAGNOSIS — R11.10 VOMITING AND DIARRHEA: Primary | ICD-10-CM

## 2023-12-18 DIAGNOSIS — R19.7 VOMITING AND DIARRHEA: Primary | ICD-10-CM

## 2023-12-18 DIAGNOSIS — R05.9 COUGH: ICD-10-CM

## 2023-12-18 LAB
ALBUMIN SERPL BCP-MCNC: 4.6 G/DL (ref 3.5–5)
ALP SERPL-CCNC: 108 U/L (ref 34–104)
ALT SERPL W P-5'-P-CCNC: 70 U/L (ref 7–52)
ANION GAP SERPL CALCULATED.3IONS-SCNC: 12 MMOL/L
AST SERPL W P-5'-P-CCNC: 44 U/L (ref 13–39)
ATRIAL RATE: 94 BPM
BASOPHILS # BLD MANUAL: 0 THOUSAND/UL (ref 0–0.1)
BASOPHILS NFR MAR MANUAL: 0 % (ref 0–1)
BILIRUB SERPL-MCNC: 0.63 MG/DL (ref 0.2–1)
BUN SERPL-MCNC: 17 MG/DL (ref 5–25)
CALCIUM SERPL-MCNC: 9.9 MG/DL (ref 8.4–10.2)
CHLORIDE SERPL-SCNC: 102 MMOL/L (ref 96–108)
CO2 SERPL-SCNC: 21 MMOL/L (ref 21–32)
CREAT SERPL-MCNC: 0.84 MG/DL (ref 0.6–1.3)
EOSINOPHIL # BLD MANUAL: 0 THOUSAND/UL (ref 0–0.4)
EOSINOPHIL NFR BLD MANUAL: 0 % (ref 0–6)
ERYTHROCYTE [DISTWIDTH] IN BLOOD BY AUTOMATED COUNT: 13.6 % (ref 11.6–15.1)
FLUAV RNA RESP QL NAA+PROBE: NEGATIVE
FLUBV RNA RESP QL NAA+PROBE: NEGATIVE
GFR SERPL CREATININE-BSD FRML MDRD: 105 ML/MIN/1.73SQ M
GLUCOSE SERPL-MCNC: 114 MG/DL (ref 65–140)
HCT VFR BLD AUTO: 50.2 % (ref 36.5–49.3)
HGB BLD-MCNC: 16.6 G/DL (ref 12–17)
LIPASE SERPL-CCNC: 41 U/L (ref 11–82)
LYMPHOCYTES # BLD AUTO: 1.8 THOUSAND/UL (ref 0.6–4.47)
LYMPHOCYTES # BLD AUTO: 13 % (ref 14–44)
MCH RBC QN AUTO: 30.9 PG (ref 26.8–34.3)
MCHC RBC AUTO-ENTMCNC: 33.1 G/DL (ref 31.4–37.4)
MCV RBC AUTO: 93 FL (ref 82–98)
MONOCYTES # BLD AUTO: 1.11 THOUSAND/UL (ref 0–1.22)
MONOCYTES NFR BLD: 8 % (ref 4–12)
NEUTROPHILS # BLD MANUAL: 10.93 THOUSAND/UL (ref 1.85–7.62)
NEUTS BAND NFR BLD MANUAL: 21 % (ref 0–8)
NEUTS SEG NFR BLD AUTO: 58 % (ref 43–75)
P AXIS: 58 DEGREES
PLATELET # BLD AUTO: 372 THOUSANDS/UL (ref 149–390)
PLATELET BLD QL SMEAR: ADEQUATE
PMV BLD AUTO: 9.3 FL (ref 8.9–12.7)
POTASSIUM SERPL-SCNC: 4.1 MMOL/L (ref 3.5–5.3)
PR INTERVAL: 148 MS
PROT SERPL-MCNC: 9.3 G/DL (ref 6.4–8.4)
QRS AXIS: -76 DEGREES
QRSD INTERVAL: 166 MS
QT INTERVAL: 402 MS
QTC INTERVAL: 502 MS
RBC # BLD AUTO: 5.38 MILLION/UL (ref 3.88–5.62)
RBC MORPH BLD: NORMAL
SARS-COV-2 RNA RESP QL NAA+PROBE: POSITIVE
SODIUM SERPL-SCNC: 135 MMOL/L (ref 135–147)
T WAVE AXIS: 19 DEGREES
VENTRICULAR RATE: 94 BPM
WBC # BLD AUTO: 13.84 THOUSAND/UL (ref 4.31–10.16)

## 2023-12-18 PROCEDURE — 83690 ASSAY OF LIPASE: CPT | Performed by: PHYSICIAN ASSISTANT

## 2023-12-18 PROCEDURE — 96361 HYDRATE IV INFUSION ADD-ON: CPT

## 2023-12-18 PROCEDURE — 99284 EMERGENCY DEPT VISIT MOD MDM: CPT | Performed by: PHYSICIAN ASSISTANT

## 2023-12-18 PROCEDURE — 99284 EMERGENCY DEPT VISIT MOD MDM: CPT

## 2023-12-18 PROCEDURE — 85027 COMPLETE CBC AUTOMATED: CPT | Performed by: PHYSICIAN ASSISTANT

## 2023-12-18 PROCEDURE — 80053 COMPREHEN METABOLIC PANEL: CPT | Performed by: PHYSICIAN ASSISTANT

## 2023-12-18 PROCEDURE — 96375 TX/PRO/DX INJ NEW DRUG ADDON: CPT

## 2023-12-18 PROCEDURE — 96374 THER/PROPH/DIAG INJ IV PUSH: CPT

## 2023-12-18 PROCEDURE — 87636 SARSCOV2 & INF A&B AMP PRB: CPT | Performed by: PHYSICIAN ASSISTANT

## 2023-12-18 PROCEDURE — 36415 COLL VENOUS BLD VENIPUNCTURE: CPT | Performed by: PHYSICIAN ASSISTANT

## 2023-12-18 PROCEDURE — 93005 ELECTROCARDIOGRAM TRACING: CPT

## 2023-12-18 PROCEDURE — 85007 BL SMEAR W/DIFF WBC COUNT: CPT | Performed by: PHYSICIAN ASSISTANT

## 2023-12-18 RX ORDER — KETOROLAC TROMETHAMINE 30 MG/ML
15 INJECTION, SOLUTION INTRAMUSCULAR; INTRAVENOUS ONCE
Status: COMPLETED | OUTPATIENT
Start: 2023-12-18 | End: 2023-12-18

## 2023-12-18 RX ORDER — ONDANSETRON 4 MG/1
4 TABLET, ORALLY DISINTEGRATING ORAL EVERY 6 HOURS PRN
Qty: 20 TABLET | Refills: 0 | Status: SHIPPED | OUTPATIENT
Start: 2023-12-18

## 2023-12-18 RX ORDER — DICYCLOMINE HCL 20 MG
20 TABLET ORAL ONCE
Status: COMPLETED | OUTPATIENT
Start: 2023-12-18 | End: 2023-12-18

## 2023-12-18 RX ORDER — DICYCLOMINE HCL 20 MG
20 TABLET ORAL 2 TIMES DAILY
Qty: 20 TABLET | Refills: 0 | Status: SHIPPED | OUTPATIENT
Start: 2023-12-18

## 2023-12-18 RX ORDER — ONDANSETRON 2 MG/ML
4 INJECTION INTRAMUSCULAR; INTRAVENOUS ONCE
Status: COMPLETED | OUTPATIENT
Start: 2023-12-18 | End: 2023-12-18

## 2023-12-18 RX ADMIN — DICYCLOMINE HYDROCHLORIDE 20 MG: 20 TABLET ORAL at 04:02

## 2023-12-18 RX ADMIN — KETOROLAC TROMETHAMINE 15 MG: 30 INJECTION, SOLUTION INTRAMUSCULAR; INTRAVENOUS at 05:26

## 2023-12-18 RX ADMIN — SODIUM CHLORIDE 1000 ML: 0.9 INJECTION, SOLUTION INTRAVENOUS at 04:02

## 2023-12-18 RX ADMIN — ONDANSETRON 4 MG: 2 INJECTION INTRAMUSCULAR; INTRAVENOUS at 04:03

## 2023-12-18 NOTE — DISCHARGE INSTRUCTIONS
Please refer to the attached information for strict return instructions.  If symptoms worsen or new symptoms develop please return to the ER.  Please follow-up with your primary care physician for re-evaluation of symptoms.

## 2023-12-18 NOTE — ED PROVIDER NOTES
"History  Chief Complaint   Patient presents with    Vomiting     Pt with vomiting and diarrhea.  \"I keep passing out.\"     This is a 43-year-old male presenting to the ED for evaluation of nausea, vomiting and diarrhea.  Patient states that he started with a sore throat and cough a couple days ago.  Patient states positive sick contacts at work.  Patient states he started with nausea, vomiting and diarrhea yesterday.  Patient states he has had multiple episodes of nonbloody nonbilious emesis and nonbloody diarrhea.  Patient states that he is able to tolerate some oral intake but not all.  Patient states that when he has episodes of vomiting and diarrhea he does have a presyncopal episode.  Patient states he feels like he is going to pass out, he has tunnel vision and lower symptoms the ground.  Patient denies any true loss of consciousness.  He endorses subjective fevers.  He denies any abdominal pain.  He denies any urinary symptoms, flank pain or back pain.      History provided by:  Patient   used: No        Prior to Admission Medications   Prescriptions Last Dose Informant Patient Reported? Taking?   VITAMIN D PO   Yes No   Sig: Take by mouth   albuterol (ProAir HFA) 90 mcg/act inhaler   No No   Sig: Inhale 2 puffs 4 (four) times a day   pantoprazole (PROTONIX) 40 mg tablet   No No   Sig: Take 1 tablet (40 mg total) by mouth daily before breakfast      Facility-Administered Medications: None       Past Medical History:   Diagnosis Date    Generalized anxiety disorder 3/29/2018    No known health problems        Past Surgical History:   Procedure Laterality Date    NO PAST SURGERIES         Family History   Problem Relation Age of Onset    Hypertension Mother     Diabetes Father         Mellitus    Hypertension Father      I have reviewed and agree with the history as documented.    E-Cigarette/Vaping    E-Cigarette Use Never User      E-Cigarette/Vaping Substances     Social History "     Tobacco Use    Smoking status: Never    Smokeless tobacco: Never   Vaping Use    Vaping status: Never Used   Substance Use Topics    Alcohol use: Yes     Comment: social    Drug use: No       Review of Systems   Constitutional:  Positive for fatigue and fever (subjective).   HENT:  Positive for sore throat. Negative for congestion.    Respiratory:  Positive for cough.    Cardiovascular:  Negative for chest pain and palpitations.   Gastrointestinal:  Positive for diarrhea, nausea and vomiting. Negative for abdominal pain.   Genitourinary:  Negative for flank pain, hematuria and urgency.   All other systems reviewed and are negative.      Physical Exam  Physical Exam  Vitals reviewed.   Constitutional:       General: He is not in acute distress.     Appearance: Normal appearance. He is well-developed and well-groomed. He is not ill-appearing, toxic-appearing or diaphoretic.   HENT:      Head: Normocephalic and atraumatic.      Right Ear: External ear normal.      Left Ear: External ear normal.      Nose: Nose normal. No congestion or rhinorrhea.      Mouth/Throat:      Mouth: Mucous membranes are moist.      Pharynx: Oropharynx is clear. No oropharyngeal exudate or posterior oropharyngeal erythema.   Eyes:      General: No scleral icterus.        Right eye: No discharge.         Left eye: No discharge.      Extraocular Movements: Extraocular movements intact.      Conjunctiva/sclera: Conjunctivae normal.   Cardiovascular:      Rate and Rhythm: Normal rate and regular rhythm.      Pulses: Normal pulses.      Heart sounds: No murmur heard.     No friction rub. No gallop.   Pulmonary:      Effort: Pulmonary effort is normal. No respiratory distress.      Breath sounds: Normal breath sounds. No wheezing, rhonchi or rales.   Abdominal:      General: Abdomen is flat. There is no distension.      Palpations: Abdomen is soft.      Tenderness: There is no abdominal tenderness. There is no right CVA tenderness, left CVA  tenderness, guarding or rebound.   Musculoskeletal:         General: No deformity. Normal range of motion.      Cervical back: Normal range of motion and neck supple.   Skin:     General: Skin is warm and dry.      Coloration: Skin is not jaundiced or pale.      Findings: No rash.   Neurological:      General: No focal deficit present.      Mental Status: He is alert.   Psychiatric:         Mood and Affect: Mood normal.         Behavior: Behavior normal. Behavior is cooperative.         Vital Signs  ED Triage Vitals   Temperature Pulse Respirations Blood Pressure SpO2   12/18/23 0327 12/18/23 0327 12/18/23 0327 12/18/23 0327 12/18/23 0327   98.2 °F (36.8 °C) (!) 116 16 150/100 97 %      Temp Source Heart Rate Source Patient Position - Orthostatic VS BP Location FiO2 (%)   12/18/23 0327 12/18/23 0500 12/18/23 0327 12/18/23 0327 --   Oral Monitor Sitting Right arm       Pain Score       12/18/23 0327       7           Vitals:    12/18/23 0327 12/18/23 0500   BP: 150/100 126/80   Pulse: (!) 116 86   Patient Position - Orthostatic VS: Sitting Lying         Visual Acuity      ED Medications  Medications   ondansetron (ZOFRAN) injection 4 mg (4 mg Intravenous Given 12/18/23 0403)   dicyclomine (BENTYL) tablet 20 mg (20 mg Oral Given 12/18/23 0402)   sodium chloride 0.9 % bolus 1,000 mL (0 mL Intravenous Stopped 12/18/23 0502)   ketorolac (TORADOL) injection 15 mg (15 mg Intravenous Given 12/18/23 0526)       Diagnostic Studies  Results Reviewed       Procedure Component Value Units Date/Time    Comprehensive metabolic panel [[id number]]  (Abnormal) Collected: 12/18/23 0357    Lab Status: Final result Specimen: Blood from Arm, Left Updated: 12/18/23 0422     Sodium 135 mmol/L      Potassium 4.1 mmol/L      Chloride 102 mmol/L      CO2 21 mmol/L      ANION GAP 12 mmol/L      BUN 17 mg/dL      Creatinine 0.84 mg/dL      Glucose 114 mg/dL      Calcium 9.9 mg/dL      AST 44 U/L      ALT 70 U/L      Alkaline Phosphatase 108 U/L   "    Total Protein 9.3 g/dL      Albumin 4.6 g/dL      Total Bilirubin 0.63 mg/dL      eGFR 105 ml/min/1.73sq m     Narrative:      National Kid[employer] guidelines for Chronic Kidney Disease (CKD):     Stage 1 with normal or high GFR (GFR > 90 mL/min/1.73 square meters)    Stage 2 Mild CKD (GFR = 60-89 mL/min/1.73 square meters)    Stage 3A Moderate CKD (GFR = 45-59 mL/min/1.73 square meters)    Stage 3B Moderate CKD (GFR = 30-44 mL/min/1.73 square meters)    Stage 4 Severe CKD (GFR = 15-29 mL/min/1.73 square meters)    Stage 5 End Stage CKD (GFR <15 mL/min/1.73 square meters)  Note: GFR calculation is accurate only with a steady state creatinine    Lipase [[id number]]  (Normal) Collected: 12/18/23 0357    Lab Status: Final result Specimen: Blood from Arm, Left Updated: 12/18/23 0422     Lipase 41 u/L     CBC and differential [[id number]]  (Abnormal) Collected: 12/18/23 0357    Lab Status: Final result Specimen: Blood from Arm, Left Updated: 12/18/23 0413     WBC 13.84 Thousand/uL      RBC 5.38 Million/uL      Hemoglobin 16.6 g/dL      Hematocrit 50.2 %      MCV 93 fL      MCH 30.9 pg      MCHC 33.1 g/dL      RDW 13.6 %      MPV 9.3 fL      Platelets 372 Thousands/uL     Manual Differential(PHLEBS Do Not Order) [[id number]] Collected: 12/18/23 0357    Lab Status: In process Specimen: Blood from Arm, Left Updated: 12/18/23 0412    FLU/COVID - if FLU clinically relevant [[id number]] Collected: 12/18/23 0348    Lab Status: In process Specimen: Nares from Nose Updated: 12/18/23 0351                   No orders to display              Procedures  ECG 12 Lead Documentation Only    Date/Time: 12/18/2023 4:13 AM    Performed by: Catherine Witt PA-C  Authorized by: Catherine Witt PA-C    Indications / Diagnosis:  \"Syncope\"  ECG reviewed by me, the ED Provider: yes    Patient location:  ED  Previous ECG:     Previous ECG:  Unavailable    Comparison to cardiac monitor: No    Interpretation:     Interpretation: " abnormal    Quality:     Tracing quality:  Limited by artifact  Rate:     ECG rate:  94    ECG rate assessment: normal    Rhythm:     Rhythm: sinus rhythm    Ectopy:     Ectopy: none    QRS:     QRS axis:  Normal    QRS intervals:  Normal  Conduction:     Conduction: abnormal      Abnormal conduction: incomplete RBBB, LAFB and bifascicular block    ST segments:     ST segments:  Normal  T waves:     T waves: normal    Other findings:     Other findings: LAE and LVH             ED Course  ED Course as of 12/18/23 0536   Mon Dec 18, 2023   0421 ECG 12 lead  Normal sinus rhythm  Possible Left atrial enlargement  Right bundle branch block  Left anterior fascicular block      Bifascicular block   Left ventricular hypertrophy  Poor R wave progression  Abnormal ECG  No previous ECGs available  Confirmed by Vinod Meade ([id number]) on 12/18/2023 4:20:23 AM   0423 Comprehensive metabolic panel(!)  Transaminitis likely 2/2 gastroenteritis as patient is w/o any abdominal pain.   0506 Pt c/o headache. Will treat with toradol.                               SBIRT 20yo+      Flowsheet Row Most Recent Value   Initial Alcohol Screen: US AUDIT-C     1. How often do you have a drink containing alcohol? 2 Filed at: 12/18/2023 0502   2. How many drinks containing alcohol do you have on a typical day you are drinking?  0 Filed at: 12/18/2023 0502   3a. Male UNDER 65: How often do you have five or more drinks on one occasion? 0 Filed at: 12/18/2023 0502   3b. FEMALE Any Age, or MALE 65+: How often do you have 4 or more drinks on one occassion? 0 Filed at: 12/18/2023 0502   Audit-C Score 2 Filed at: 12/18/2023 0502   ROBERTO CARLOS: How many times in the past year have you...    Used an illegal drug or used a prescription medication for non-medical reasons? Never Filed at: 12/18/2023 0502                      Medical Decision Making      DDx including but not limited to: food poisoning, viral illness, colitis, enteritis, IBS, IBD, metabolic  abnormality, pancreatitis, dehydration, GI etiology, adverse reaction; doubt acute surgical intraabdominal process, Boorhave's syndrome, mediastinitis.     Will order CBC for eval of anemia and leukocytosis, CMP for eval of LFTs, kidney function and electrolyte abnormalities.  Lipase for eval of acute pancreatitis.  Will check EKG for evaluation of cardiac arrhythmia although low suspicion.  Will treat symptomatically with IV fluids, Zofran and Bentyl.    Mild transaminitis 2/2 gastroenteritis. Pt without abdominal pain to suggest acute surgical disease process. Do not believe imaging is necessary at this time.  Patient feels improved. Will discharge w/ Zofran and bentyl for symptoms.     Prior to discharge, discharge instructions were discussed with patient at bedside. Patient was provided both verbal and written instructions. Patient is understanding of the discharge instructions and is agreeable to plan of care. Return precautions were discussed with patient bedside, patient verbalized understanding of signs and symptoms that would necessitate return to the ED. All questions were answered. Patient was comfortable with the plan of care and discharged to home.     Dispo: discharge home with follow up to PCP. Patient stable, in no acute distress and non-toxic at discharge.    Problems Addressed:  Cough: acute illness or injury  Headache: acute illness or injury  Vomiting and diarrhea: acute illness or injury    Amount and/or Complexity of Data Reviewed  Labs: ordered. Decision-making details documented in ED Course.  ECG/medicine tests:  Decision-making details documented in ED Course.    Risk  Prescription drug management.             Disposition  Final diagnoses:   Vomiting and diarrhea   Headache   Cough     Time reflects when diagnosis was documented in both MDM as applicable and the Disposition within this note       Time User Action Codes Description Comment    12/18/2023  5:00 AM Catherine Witt Add [R11.10,   R19.7] Vomiting and diarrhea     12/18/2023  5:36 AM aCtherine Witt [R51.9] Headache     12/18/2023  5:36 AM Catherine Witt [R05.9] Cough           ED Disposition       ED Disposition   Discharge    Condition   Stable    Date/Time   Mon Dec 18, 2023 0501    Comment   Nash Naik discharge to home/self care.                   Follow-up Information       Follow up With Specialties Details Why Contact Info    Arjun Mclean DO Family Medicine Schedule an appointment as soon as possible for a visit  As needed [address] [place] [id number]  [phone]              Patient's Medications   Discharge Prescriptions    DICYCLOMINE (BENTYL) 20 MG TABLET    Take 1 tablet (20 mg total) by mouth 2 (two) times a day       Start Date: 12/18/2023End Date: --       Order Dose: 20 mg       Quantity: 20 tablet    Refills: 0    ONDANSETRON (ZOFRAN-ODT) 4 MG DISINTEGRATING TABLET    Take 1 tablet (4 mg total) by mouth every 6 (six) hours as needed for nausea or vomiting       Start Date: 12/18/2023End Date: --       Order Dose: 4 mg       Quantity: 20 tablet    Refills: 0       No discharge procedures on file.    PDMP Review       None            ED Provider  Electronically Signed by Catherine Witt PA-C  12/18/23 0591

## 2023-12-18 NOTE — Clinical Note
Nash Naik was seen and treated in our emergency department on 12/18/2023.                Diagnosis: Vomiting    Nash  may return to work on return date.    He may return on this date: 12/19/2023         If you have any questions or concerns, please don't hesitate to call.      Catherine Witt PA-C    ______________________________           _______________          _______________  Hospital Representative                              Date                                Time

## 2024-02-21 DIAGNOSIS — Z00.6 ENCOUNTER FOR EXAMINATION FOR NORMAL COMPARISON OR CONTROL IN CLINICAL RESEARCH PROGRAM: ICD-10-CM

## 2024-03-04 ENCOUNTER — APPOINTMENT (OUTPATIENT)
Dept: LAB | Facility: MEDICAL CENTER | Age: 44
End: 2024-03-04

## 2024-03-04 DIAGNOSIS — Z00.6 ENCOUNTER FOR EXAMINATION FOR NORMAL COMPARISON OR CONTROL IN CLINICAL RESEARCH PROGRAM: ICD-10-CM

## 2024-03-04 PROCEDURE — 36415 COLL VENOUS BLD VENIPUNCTURE: CPT

## 2024-03-24 LAB
APOB+LDLR+PCSK9 GENE MUT ANL BLD/T: NOT DETECTED
BRCA1+BRCA2 DEL+DUP + FULL MUT ANL BLD/T: NOT DETECTED
MLH1+MSH2+MSH6+PMS2 GN DEL+DUP+FUL M: NOT DETECTED